# Patient Record
Sex: MALE | Race: WHITE | URBAN - METROPOLITAN AREA
[De-identification: names, ages, dates, MRNs, and addresses within clinical notes are randomized per-mention and may not be internally consistent; named-entity substitution may affect disease eponyms.]

---

## 2017-01-16 ENCOUNTER — HISTORICAL (OUTPATIENT)
Dept: ADMINISTRATIVE | Facility: HOSPITAL | Age: 72
End: 2017-01-16

## 2017-07-19 ENCOUNTER — HISTORICAL (OUTPATIENT)
Dept: LAB | Facility: HOSPITAL | Age: 72
End: 2017-07-19

## 2017-07-19 LAB — DEPRECATED CALCIDIOL+CALCIFEROL SERPL-MC: 99.3 NG/ML (ref 30–80)

## 2018-09-19 ENCOUNTER — HISTORICAL (OUTPATIENT)
Dept: ADMINISTRATIVE | Facility: HOSPITAL | Age: 73
End: 2018-09-19

## 2019-01-15 ENCOUNTER — HISTORICAL (OUTPATIENT)
Dept: LAB | Facility: HOSPITAL | Age: 74
End: 2019-01-15

## 2019-01-15 LAB
ALBUMIN SERPL-MCNC: 3.9 GM/DL (ref 3.4–5)
ALBUMIN/GLOB SERPL: 1 {RATIO}
ALP SERPL-CCNC: 56 UNIT/L (ref 50–136)
ALT SERPL-CCNC: 15 UNIT/L (ref 12–78)
AST SERPL-CCNC: 10 UNIT/L (ref 15–37)
BILIRUB SERPL-MCNC: 0.5 MG/DL (ref 0.2–1)
BILIRUBIN DIRECT+TOT PNL SERPL-MCNC: 0.1 MG/DL (ref 0–0.2)
BILIRUBIN DIRECT+TOT PNL SERPL-MCNC: 0.4 MG/DL (ref 0–0.8)
BUN SERPL-MCNC: 24 MG/DL (ref 7–18)
CALCIUM SERPL-MCNC: 9.5 MG/DL (ref 8.5–10.1)
CHLORIDE SERPL-SCNC: 105 MMOL/L (ref 98–107)
CHOLEST SERPL-MCNC: 126 MG/DL (ref 0–200)
CHOLEST/HDLC SERPL: 2.3 {RATIO} (ref 0–5)
CO2 SERPL-SCNC: 27 MMOL/L (ref 21–32)
CREAT SERPL-MCNC: 0.98 MG/DL (ref 0.7–1.3)
DEPRECATED CALCIDIOL+CALCIFEROL SERPL-MC: 51.54 NG/ML (ref 30–80)
GLOBULIN SER-MCNC: 4 GM/DL (ref 2.4–3.5)
GLUCOSE SERPL-MCNC: 70 MG/DL (ref 74–106)
HDLC SERPL-MCNC: 55 MG/DL (ref 35–60)
LDLC SERPL CALC-MCNC: 51 MG/DL (ref 0–129)
POTASSIUM SERPL-SCNC: 4.3 MMOL/L (ref 3.5–5.1)
PROT SERPL-MCNC: 7.9 GM/DL (ref 6.4–8.2)
PSA SERPL-MCNC: 1.53 NG/ML (ref 0–4)
SODIUM SERPL-SCNC: 140 MMOL/L (ref 136–145)
TRIGL SERPL-MCNC: 102 MG/DL (ref 30–150)
VLDLC SERPL CALC-MCNC: 20 MG/DL

## 2020-02-11 ENCOUNTER — HISTORICAL (OUTPATIENT)
Dept: ADMINISTRATIVE | Facility: HOSPITAL | Age: 75
End: 2020-02-11

## 2020-02-11 LAB
ABS NEUT (OLG): 4 X10(3)/MCL (ref 2.1–9.2)
ALBUMIN SERPL-MCNC: 3.3 GM/DL (ref 3.4–5)
ALBUMIN/GLOB SERPL: 1 {RATIO}
ALP SERPL-CCNC: 52 UNIT/L (ref 50–136)
ALT SERPL-CCNC: 14 UNIT/L (ref 12–78)
AST SERPL-CCNC: 7 UNIT/L (ref 15–37)
BASOPHILS # BLD AUTO: 0.1 X10(3)/MCL (ref 0–0.2)
BASOPHILS NFR BLD AUTO: 2 %
BILIRUB SERPL-MCNC: 0.5 MG/DL (ref 0.2–1)
BILIRUBIN DIRECT+TOT PNL SERPL-MCNC: 0.2 MG/DL (ref 0–0.2)
BILIRUBIN DIRECT+TOT PNL SERPL-MCNC: 0.3 MG/DL (ref 0–0.8)
BUN SERPL-MCNC: 16 MG/DL (ref 7–18)
CALCIUM SERPL-MCNC: 8.9 MG/DL (ref 8.5–10.1)
CHLORIDE SERPL-SCNC: 106 MMOL/L (ref 98–107)
CHOLEST SERPL-MCNC: 125 MG/DL (ref 0–200)
CHOLEST/HDLC SERPL: 2.2 {RATIO} (ref 0–5)
CO2 SERPL-SCNC: 32 MMOL/L (ref 21–32)
CREAT SERPL-MCNC: 0.96 MG/DL (ref 0.7–1.3)
DEPRECATED CALCIDIOL+CALCIFEROL SERPL-MC: 45.67 NG/ML (ref 30–80)
EOSINOPHIL # BLD AUTO: 0.5 X10(3)/MCL (ref 0–0.9)
EOSINOPHIL NFR BLD AUTO: 6 %
ERYTHROCYTE [DISTWIDTH] IN BLOOD BY AUTOMATED COUNT: 13.8 % (ref 11.5–17)
GLOBULIN SER-MCNC: 3.3 GM/DL (ref 2.4–3.5)
GLUCOSE SERPL-MCNC: 74 MG/DL (ref 74–106)
HCT VFR BLD AUTO: 45.7 % (ref 42–52)
HDLC SERPL-MCNC: 56 MG/DL (ref 35–60)
HGB BLD-MCNC: 14.9 GM/DL (ref 14–18)
LDLC SERPL CALC-MCNC: 45 MG/DL (ref 0–129)
LYMPHOCYTES # BLD AUTO: 2.9 X10(3)/MCL (ref 0.6–4.6)
LYMPHOCYTES NFR BLD AUTO: 36 %
MCH RBC QN AUTO: 31.1 PG (ref 27–31)
MCHC RBC AUTO-ENTMCNC: 32.6 GM/DL (ref 33–36)
MCV RBC AUTO: 95.4 FL (ref 80–94)
MONOCYTES # BLD AUTO: 0.6 X10(3)/MCL (ref 0.1–1.3)
MONOCYTES NFR BLD AUTO: 7 %
NEUTROPHILS # BLD AUTO: 4 X10(3)/MCL (ref 2.1–9.2)
NEUTROPHILS NFR BLD AUTO: 48 %
PLATELET # BLD AUTO: 216 X10(3)/MCL (ref 130–400)
PMV BLD AUTO: 10 FL (ref 9.4–12.4)
POTASSIUM SERPL-SCNC: 3.8 MMOL/L (ref 3.5–5.1)
PROT SERPL-MCNC: 6.6 GM/DL (ref 6.4–8.2)
RBC # BLD AUTO: 4.79 X10(6)/MCL (ref 4.7–6.1)
SODIUM SERPL-SCNC: 141 MMOL/L (ref 136–145)
TRIGL SERPL-MCNC: 120 MG/DL (ref 30–150)
VLDLC SERPL CALC-MCNC: 24 MG/DL
WBC # SPEC AUTO: 8.2 X10(3)/MCL (ref 4.5–11.5)

## 2020-11-10 ENCOUNTER — HISTORICAL (OUTPATIENT)
Dept: ADMINISTRATIVE | Facility: HOSPITAL | Age: 75
End: 2020-11-10

## 2020-11-10 LAB
ABS NEUT (OLG): 5.51 X10(3)/MCL (ref 2.1–9.2)
ALBUMIN SERPL-MCNC: 3 GM/DL (ref 3.4–4.8)
ALBUMIN/GLOB SERPL: 0.9 RATIO (ref 1.1–2)
ALP SERPL-CCNC: 70 UNIT/L (ref 40–150)
ALT SERPL-CCNC: 17 UNIT/L (ref 0–55)
AST SERPL-CCNC: 20 UNIT/L (ref 5–34)
BASOPHILS # BLD AUTO: 0.09 X10(3)/MCL (ref 0–0.2)
BASOPHILS NFR BLD AUTO: 1 % (ref 0–0.9)
BILIRUB SERPL-MCNC: 0.8 MG/DL (ref 0.2–1.2)
BILIRUBIN DIRECT+TOT PNL SERPL-MCNC: 0.3 MG/DL (ref 0–0.5)
BILIRUBIN DIRECT+TOT PNL SERPL-MCNC: 0.5 MG/DL (ref 0–0.8)
BUN SERPL-MCNC: 15.6 MG/DL (ref 8.4–25.7)
CALCIUM SERPL-MCNC: 8.8 MG/DL (ref 8.8–10)
CHLORIDE SERPL-SCNC: 105 MMOL/L (ref 98–107)
CO2 SERPL-SCNC: 23 MMOL/L (ref 23–31)
CREAT SERPL-MCNC: 0.75 MG/DL (ref 0.72–1.25)
DEPRECATED CALCIDIOL+CALCIFEROL SERPL-MC: 45.9 NG/ML (ref 30–80)
EOSINOPHIL # BLD AUTO: 0.38 X10(3)/MCL (ref 0–0.9)
EOSINOPHIL NFR BLD AUTO: 4.2 % (ref 0–6.5)
ERYTHROCYTE [DISTWIDTH] IN BLOOD BY AUTOMATED COUNT: 12.9 % (ref 11.5–17)
GLOBULIN SER-MCNC: 3.5 GM/DL (ref 2.4–3.5)
GLUCOSE SERPL-MCNC: 98 MG/DL (ref 82–115)
HCT VFR BLD AUTO: 46.9 % (ref 42–52)
HGB BLD-MCNC: 16 GM/DL (ref 14–18)
IMM GRANULOCYTES # BLD AUTO: 0.35 10*3/UL (ref 0–0.02)
IMM GRANULOCYTES NFR BLD AUTO: 3.9 % (ref 0–0.43)
LYMPHOCYTES # BLD AUTO: 1.86 X10(3)/MCL (ref 0.6–4.6)
LYMPHOCYTES NFR BLD AUTO: 20.5 % (ref 16.2–38.3)
MAGNESIUM SERPL-MCNC: 2.44 MG/DL (ref 1.6–2.6)
MCH RBC QN AUTO: 31.4 PG (ref 27–31)
MCHC RBC AUTO-ENTMCNC: 34.1 GM/DL (ref 33–36)
MCV RBC AUTO: 92 FL (ref 80–94)
MONOCYTES # BLD AUTO: 0.88 X10(3)/MCL (ref 0.1–1.3)
MONOCYTES NFR BLD AUTO: 9.7 % (ref 4.7–11.3)
NEUTROPHILS # BLD AUTO: 5.51 X10(3)/MCL (ref 2.1–9.2)
NEUTROPHILS NFR BLD AUTO: 60.7 % (ref 49.1–73.4)
NRBC BLD AUTO-RTO: 0 % (ref 0–0.2)
PHOSPHATE SERPL-MCNC: 4.2 MG/DL (ref 2.3–4.7)
PLATELET # BLD AUTO: 239 X10(3)/MCL (ref 130–400)
PMV BLD AUTO: 10.5 FL (ref 7.4–10.4)
POTASSIUM SERPL-SCNC: 4.4 MMOL/L (ref 3.5–5.1)
PREALB SERPL-MCNC: 15.8 MG/DL (ref 16–42)
PROT SERPL-MCNC: 6.5 GM/DL (ref 5.8–7.6)
RBC # BLD AUTO: 5.1 X10(6)/MCL (ref 4.7–6.1)
SODIUM SERPL-SCNC: 137 MMOL/L (ref 136–145)
WBC # SPEC AUTO: 9.1 X10(3)/MCL (ref 4.5–11.5)

## 2020-11-16 LAB
ABS NEUT (OLG): 3.63 X10(3)/MCL (ref 2.1–9.2)
ALBUMIN SERPL-MCNC: 3 GM/DL (ref 3.4–4.8)
ALBUMIN/GLOB SERPL: 0.7 RATIO (ref 1.1–2)
ALP SERPL-CCNC: 78 UNIT/L (ref 40–150)
ALT SERPL-CCNC: 23 UNIT/L (ref 0–55)
AST SERPL-CCNC: 21 UNIT/L (ref 5–34)
BASOPHILS # BLD AUTO: 0.07 X10(3)/MCL (ref 0–0.2)
BASOPHILS NFR BLD AUTO: 1 % (ref 0–0.9)
BILIRUB SERPL-MCNC: 0.6 MG/DL (ref 0.2–1.2)
BILIRUBIN DIRECT+TOT PNL SERPL-MCNC: 0.3 MG/DL (ref 0–0.5)
BILIRUBIN DIRECT+TOT PNL SERPL-MCNC: 0.3 MG/DL (ref 0–0.8)
BUN SERPL-MCNC: 17 MG/DL (ref 8.4–25.7)
CALCIUM SERPL-MCNC: 9.1 MG/DL (ref 8.8–10)
CHLORIDE SERPL-SCNC: 102 MMOL/L (ref 98–107)
CHOLEST SERPL-MCNC: 130 MG/DL
CHOLEST/HDLC SERPL: 5 {RATIO} (ref 0–5)
CO2 SERPL-SCNC: 20 MMOL/L (ref 23–31)
CREAT SERPL-MCNC: 0.93 MG/DL (ref 0.72–1.25)
EOSINOPHIL # BLD AUTO: 0.31 X10(3)/MCL (ref 0–0.9)
EOSINOPHIL NFR BLD AUTO: 4.4 % (ref 0–6.5)
ERYTHROCYTE [DISTWIDTH] IN BLOOD BY AUTOMATED COUNT: 13.1 % (ref 11.5–17)
GLOBULIN SER-MCNC: 4.1 GM/DL (ref 2.4–3.5)
GLUCOSE SERPL-MCNC: 87 MG/DL (ref 82–115)
HCT VFR BLD AUTO: 46 % (ref 42–52)
HDLC SERPL-MCNC: 26 MG/DL (ref 40–60)
HGB BLD-MCNC: 15.7 GM/DL (ref 14–18)
IMM GRANULOCYTES # BLD AUTO: 0.04 10*3/UL (ref 0–0.02)
IMM GRANULOCYTES NFR BLD AUTO: 0.6 % (ref 0–0.43)
LDLC SERPL CALC-MCNC: 84 MG/DL (ref 50–140)
LYMPHOCYTES # BLD AUTO: 2.37 X10(3)/MCL (ref 0.6–4.6)
LYMPHOCYTES NFR BLD AUTO: 33.4 % (ref 16.2–38.3)
MAGNESIUM SERPL-MCNC: 2.41 MG/DL (ref 1.6–2.6)
MCH RBC QN AUTO: 31 PG (ref 27–31)
MCHC RBC AUTO-ENTMCNC: 34.1 GM/DL (ref 33–36)
MCV RBC AUTO: 90.9 FL (ref 80–94)
MONOCYTES # BLD AUTO: 0.68 X10(3)/MCL (ref 0.1–1.3)
MONOCYTES NFR BLD AUTO: 9.6 % (ref 4.7–11.3)
NEUTROPHILS # BLD AUTO: 3.63 X10(3)/MCL (ref 2.1–9.2)
NEUTROPHILS NFR BLD AUTO: 51 % (ref 49.1–73.4)
NRBC BLD AUTO-RTO: 0 % (ref 0–0.2)
PHOSPHATE SERPL-MCNC: 3.9 MG/DL (ref 2.3–4.7)
PLATELET # BLD AUTO: 326 X10(3)/MCL (ref 130–400)
PMV BLD AUTO: 10.9 FL (ref 7.4–10.4)
POTASSIUM SERPL-SCNC: 3.9 MMOL/L (ref 3.5–5.1)
PREALB SERPL-MCNC: 17.2 MG/DL (ref 16–42)
PROT SERPL-MCNC: 7.1 GM/DL (ref 5.8–7.6)
RBC # BLD AUTO: 5.06 X10(6)/MCL (ref 4.7–6.1)
SODIUM SERPL-SCNC: 134 MMOL/L (ref 136–145)
TRIGL SERPL-MCNC: 99 MG/DL (ref 0–150)
VLDLC SERPL CALC-MCNC: 20 MG/DL
WBC # SPEC AUTO: 7.1 X10(3)/MCL (ref 4.5–11.5)

## 2020-11-18 LAB
ALBUMIN SERPL-MCNC: 2.9 GM/DL (ref 3.4–4.8)
ALBUMIN/GLOB SERPL: 0.8 RATIO (ref 1.1–2)
ALP SERPL-CCNC: 79 UNIT/L (ref 40–150)
ALT SERPL-CCNC: 25 UNIT/L (ref 0–55)
AST SERPL-CCNC: 22 UNIT/L (ref 5–34)
BILIRUB SERPL-MCNC: 0.7 MG/DL (ref 0.2–1.2)
BILIRUBIN DIRECT+TOT PNL SERPL-MCNC: 0.3 MG/DL (ref 0–0.8)
BILIRUBIN DIRECT+TOT PNL SERPL-MCNC: 0.4 MG/DL (ref 0–0.5)
BUN SERPL-MCNC: 13.8 MG/DL (ref 8.4–25.7)
CALCIUM SERPL-MCNC: 8.8 MG/DL (ref 8.8–10)
CHLORIDE SERPL-SCNC: 104 MMOL/L (ref 98–107)
CO2 SERPL-SCNC: 23 MMOL/L (ref 23–31)
CREAT SERPL-MCNC: 0.84 MG/DL (ref 0.72–1.25)
GLOBULIN SER-MCNC: 3.7 GM/DL (ref 2.4–3.5)
GLUCOSE SERPL-MCNC: 92 MG/DL (ref 82–115)
POTASSIUM SERPL-SCNC: 3.7 MMOL/L (ref 3.5–5.1)
PROT SERPL-MCNC: 6.6 GM/DL (ref 5.8–7.6)
SODIUM SERPL-SCNC: 136 MMOL/L (ref 136–145)

## 2020-11-23 LAB
ABS NEUT (OLG): 2.52 X10(3)/MCL (ref 2.1–9.2)
ALBUMIN SERPL-MCNC: 2.8 GM/DL (ref 3.4–4.8)
ALBUMIN/GLOB SERPL: 0.8 RATIO (ref 1.1–2)
ALP SERPL-CCNC: 83 UNIT/L (ref 40–150)
ALT SERPL-CCNC: 30 UNIT/L (ref 0–55)
AST SERPL-CCNC: 26 UNIT/L (ref 5–34)
BASOPHILS # BLD AUTO: 0.09 X10(3)/MCL (ref 0–0.2)
BASOPHILS NFR BLD AUTO: 1.5 % (ref 0–0.9)
BILIRUB SERPL-MCNC: 0.4 MG/DL (ref 0.2–1.2)
BILIRUBIN DIRECT+TOT PNL SERPL-MCNC: 0.2 MG/DL (ref 0–0.5)
BILIRUBIN DIRECT+TOT PNL SERPL-MCNC: 0.2 MG/DL (ref 0–0.8)
BUN SERPL-MCNC: 14.5 MG/DL (ref 8.4–25.7)
CALCIUM SERPL-MCNC: 9 MG/DL (ref 8.8–10)
CHLORIDE SERPL-SCNC: 103 MMOL/L (ref 98–107)
CO2 SERPL-SCNC: 22 MMOL/L (ref 23–31)
CREAT SERPL-MCNC: 0.92 MG/DL (ref 0.72–1.25)
EOSINOPHIL # BLD AUTO: 0.32 X10(3)/MCL (ref 0–0.9)
EOSINOPHIL NFR BLD AUTO: 5.3 % (ref 0–6.5)
ERYTHROCYTE [DISTWIDTH] IN BLOOD BY AUTOMATED COUNT: 13.2 % (ref 11.5–17)
GLOBULIN SER-MCNC: 3.4 GM/DL (ref 2.4–3.5)
GLUCOSE SERPL-MCNC: 90 MG/DL (ref 82–115)
HCT VFR BLD AUTO: 43.9 % (ref 42–52)
HGB BLD-MCNC: 14.8 GM/DL (ref 14–18)
IMM GRANULOCYTES # BLD AUTO: 0.03 10*3/UL (ref 0–0.02)
IMM GRANULOCYTES NFR BLD AUTO: 0.5 % (ref 0–0.43)
LYMPHOCYTES NFR BLD AUTO: 41.7 % (ref 16.2–38.3)
MAGNESIUM SERPL-MCNC: 1.98 MG/DL (ref 1.6–2.6)
MCH RBC QN AUTO: 30.8 PG (ref 27–31)
MCHC RBC AUTO-ENTMCNC: 33.7 GM/DL (ref 33–36)
MCV RBC AUTO: 91.5 FL (ref 80–94)
MONOCYTES # BLD AUTO: 0.55 X10(3)/MCL (ref 0.1–1.3)
MONOCYTES NFR BLD AUTO: 9.1 % (ref 4.7–11.3)
NEUTROPHILS # BLD AUTO: 2.52 X10(3)/MCL (ref 2.1–9.2)
NEUTROPHILS NFR BLD AUTO: 41.9 % (ref 49.1–73.4)
NRBC BLD AUTO-RTO: 0 % (ref 0–0.2)
PHOSPHATE SERPL-MCNC: 4.2 MG/DL (ref 2.3–4.7)
PLATELET # BLD AUTO: 314 X10(3)/MCL (ref 130–400)
PMV BLD AUTO: 10.7 FL (ref 7.4–10.4)
POTASSIUM SERPL-SCNC: 3.3 MMOL/L (ref 3.5–5.1)
PREALB SERPL-MCNC: 14.7 MG/DL (ref 16–42)
PROT SERPL-MCNC: 6.2 GM/DL (ref 5.8–7.6)
RBC # BLD AUTO: 4.8 X10(6)/MCL (ref 4.7–6.1)
SODIUM SERPL-SCNC: 138 MMOL/L (ref 136–145)
WBC # SPEC AUTO: 6 X10(3)/MCL (ref 4.5–11.5)

## 2020-11-25 LAB
BUN SERPL-MCNC: 14.1 MG/DL (ref 8.4–25.7)
CALCIUM SERPL-MCNC: 8.6 MG/DL (ref 8.8–10)
CHLORIDE SERPL-SCNC: 108 MMOL/L (ref 98–107)
CO2 SERPL-SCNC: 22 MMOL/L (ref 23–31)
CREAT SERPL-MCNC: 0.83 MG/DL (ref 0.72–1.25)
CREAT/UREA NIT SERPL: 17
GLUCOSE SERPL-MCNC: 139 MG/DL (ref 82–115)
POTASSIUM SERPL-SCNC: 3.9 MMOL/L (ref 3.5–5.1)
SODIUM SERPL-SCNC: 138 MMOL/L (ref 136–145)

## 2020-11-30 LAB
ABS NEUT (OLG): 4.14 X10(3)/MCL (ref 2.1–9.2)
ALBUMIN SERPL-MCNC: 2.8 GM/DL (ref 3.4–4.8)
ALBUMIN/GLOB SERPL: 0.8 RATIO (ref 1.1–2)
ALP SERPL-CCNC: 76 UNIT/L (ref 40–150)
ALT SERPL-CCNC: 16 UNIT/L (ref 0–55)
AST SERPL-CCNC: 13 UNIT/L (ref 5–34)
BASOPHILS # BLD AUTO: 0.06 X10(3)/MCL (ref 0–0.2)
BASOPHILS NFR BLD AUTO: 0.8 % (ref 0–0.9)
BILIRUB SERPL-MCNC: 0.4 MG/DL (ref 0.2–1.2)
BILIRUBIN DIRECT+TOT PNL SERPL-MCNC: 0.2 MG/DL (ref 0–0.5)
BILIRUBIN DIRECT+TOT PNL SERPL-MCNC: 0.2 MG/DL (ref 0–0.8)
BUN SERPL-MCNC: 11.1 MG/DL (ref 8.4–25.7)
CALCIUM SERPL-MCNC: 9 MG/DL (ref 8.8–10)
CHLORIDE SERPL-SCNC: 108 MMOL/L (ref 98–107)
CO2 SERPL-SCNC: 21 MMOL/L (ref 23–31)
CREAT SERPL-MCNC: 0.8 MG/DL (ref 0.72–1.25)
EOSINOPHIL # BLD AUTO: 0.45 X10(3)/MCL (ref 0–0.9)
EOSINOPHIL NFR BLD AUTO: 5.9 % (ref 0–6.5)
ERYTHROCYTE [DISTWIDTH] IN BLOOD BY AUTOMATED COUNT: 13.4 % (ref 11.5–17)
GLOBULIN SER-MCNC: 3.5 GM/DL (ref 2.4–3.5)
GLUCOSE SERPL-MCNC: 97 MG/DL (ref 82–115)
HCT VFR BLD AUTO: 42.9 % (ref 42–52)
HGB BLD-MCNC: 14.6 GM/DL (ref 14–18)
IMM GRANULOCYTES # BLD AUTO: 0.03 10*3/UL (ref 0–0.02)
IMM GRANULOCYTES NFR BLD AUTO: 0.4 % (ref 0–0.43)
LYMPHOCYTES # BLD AUTO: 2.38 X10(3)/MCL (ref 0.6–4.6)
LYMPHOCYTES NFR BLD AUTO: 31.2 % (ref 16.2–38.3)
MAGNESIUM SERPL-MCNC: 2.21 MG/DL (ref 1.6–2.6)
MCH RBC QN AUTO: 30.8 PG (ref 27–31)
MCHC RBC AUTO-ENTMCNC: 34 GM/DL (ref 33–36)
MCV RBC AUTO: 90.5 FL (ref 80–94)
MONOCYTES # BLD AUTO: 0.57 X10(3)/MCL (ref 0.1–1.3)
MONOCYTES NFR BLD AUTO: 7.5 % (ref 4.7–11.3)
NEUTROPHILS # BLD AUTO: 4.14 X10(3)/MCL (ref 2.1–9.2)
NEUTROPHILS NFR BLD AUTO: 54.2 % (ref 49.1–73.4)
NRBC BLD AUTO-RTO: 0 % (ref 0–0.2)
PHOSPHATE SERPL-MCNC: 4.1 MG/DL (ref 2.3–4.7)
PLATELET # BLD AUTO: 192 X10(3)/MCL (ref 130–400)
PMV BLD AUTO: 10 FL (ref 7.4–10.4)
POTASSIUM SERPL-SCNC: 4.2 MMOL/L (ref 3.5–5.1)
PREALB SERPL-MCNC: 20 MG/DL (ref 16–42)
PROT SERPL-MCNC: 6.3 GM/DL (ref 5.8–7.6)
RBC # BLD AUTO: 4.74 X10(6)/MCL (ref 4.7–6.1)
SODIUM SERPL-SCNC: 136 MMOL/L (ref 136–145)
WBC # SPEC AUTO: 7.6 X10(3)/MCL (ref 4.5–11.5)

## 2021-02-06 ENCOUNTER — HOSPITAL ENCOUNTER (OUTPATIENT)
Dept: MEDSURG UNIT | Facility: HOSPITAL | Age: 76
End: 2021-02-07
Attending: INTERNAL MEDICINE | Admitting: INTERNAL MEDICINE

## 2021-02-06 LAB
ALBUMIN SERPL-MCNC: 3.5 GM/DL (ref 3.4–4.8)
ALBUMIN/GLOB SERPL: 1 RATIO (ref 1.1–2)
ALP SERPL-CCNC: 62 UNIT/L (ref 40–150)
ALT SERPL-CCNC: 7 UNIT/L (ref 0–55)
ANION GAP SERPL CALC-SCNC: 14 MMOL/L
APPEARANCE, UA: CLEAR
AST SERPL-CCNC: 17 UNIT/L (ref 5–34)
BACTERIA SPEC CULT: NORMAL /HPF
BILIRUB SERPL-MCNC: 0.8 MG/DL
BILIRUB UR QL STRIP: NEGATIVE
BILIRUBIN DIRECT+TOT PNL SERPL-MCNC: 0.3 MG/DL (ref 0–0.5)
BILIRUBIN DIRECT+TOT PNL SERPL-MCNC: 0.5 MG/DL (ref 0–0.8)
BUN SERPL-MCNC: 12 MG/DL (ref 8.4–25.7)
BUN SERPL-MCNC: 15 MG/DL (ref 8–26)
CALCIUM SERPL-MCNC: 9 MG/DL (ref 8.8–10)
CHLORIDE SERPL-SCNC: 105 MMOL/L (ref 98–107)
CHLORIDE SERPL-SCNC: 105 MMOL/L (ref 98–109)
CO2 SERPL-SCNC: 20 MMOL/L (ref 23–31)
COLOR UR: YELLOW
CREAT SERPL-MCNC: 0.8 MG/DL (ref 0.6–1.3)
CREAT SERPL-MCNC: 0.84 MG/DL (ref 0.73–1.18)
ERYTHROCYTE [DISTWIDTH] IN BLOOD BY AUTOMATED COUNT: 14.6 % (ref 11.5–17)
GLOBULIN SER-MCNC: 3.5 GM/DL (ref 2.4–3.5)
GLUCOSE (UA): NEGATIVE
GLUCOSE SERPL-MCNC: 82 MG/DL (ref 82–115)
GLUCOSE SERPL-MCNC: 86 MG/DL (ref 70–105)
HCT VFR BLD AUTO: 45.9 % (ref 42–52)
HCT VFR BLD CALC: 45 % (ref 38–51)
HGB BLD-MCNC: 15.3 MG/DL (ref 12–17)
HGB BLD-MCNC: 15.4 GM/DL (ref 14–18)
HGB UR QL STRIP: NEGATIVE
KETONES UR QL STRIP: NEGATIVE
LEUKOCYTE ESTERASE UR QL STRIP: NEGATIVE
MCH RBC QN AUTO: 31.6 PG (ref 27–31)
MCHC RBC AUTO-ENTMCNC: 33.6 GM/DL (ref 33–36)
MCV RBC AUTO: 94.1 FL (ref 80–94)
NITRITE UR QL STRIP: NEGATIVE
PH UR STRIP: 6.5 [PH] (ref 5–9)
PLATELET # BLD AUTO: 274 X10(3)/MCL (ref 130–400)
PMV BLD AUTO: 9.6 FL (ref 9.4–12.4)
POC IONIZED CALCIUM: 1.05 MMOL/L (ref 1.12–1.32)
POC TCO2: 24 MMOL/L (ref 24–29)
POTASSIUM BLD-SCNC: 4.3 MMOL/L (ref 3.5–4.9)
POTASSIUM SERPL-SCNC: 4.7 MMOL/L (ref 3.5–5.1)
PROT SERPL-MCNC: 7 GM/DL (ref 5.8–7.6)
PROT UR QL STRIP: NEGATIVE
RBC # BLD AUTO: 4.88 X10(6)/MCL (ref 4.7–6.1)
RBC #/AREA URNS HPF: NORMAL /[HPF]
SARS-COV-2 RNA RESP QL NAA+PROBE: NOT DETECTED
SODIUM BLD-SCNC: 138 MMOL/L (ref 138–146)
SODIUM SERPL-SCNC: 136 MMOL/L (ref 136–145)
SP GR UR STRIP: 1.02 (ref 1–1.03)
SQUAMOUS EPITHELIAL, UA: NORMAL
TROP %DIFF IP 3 (OHS): NORMAL %
TROP 3HR (OHS): <0.01 NG/ML (ref 0–0.04)
TROP IP BASE (OHS): NORMAL NG/ML (ref 0–0.04)
TROPONIN I SERPL-MCNC: <0.01 NG/ML (ref 0–0.04)
TROPONIN I SERPL-MCNC: <0.01 NG/ML (ref 0–0.04)
TSH SERPL-ACNC: 1.59 UIU/ML (ref 0.35–4.94)
UROBILINOGEN UR STRIP-ACNC: 2
WBC # SPEC AUTO: 7.8 X10(3)/MCL (ref 4.5–11.5)
WBC #/AREA URNS HPF: NORMAL /HPF

## 2021-02-07 LAB
ABS NEUT (OLG): 3.08 X10(3)/MCL (ref 2.1–9.2)
BASOPHILS # BLD AUTO: 0.1 X10(3)/MCL (ref 0–0.2)
BASOPHILS NFR BLD AUTO: 1 %
BUN SERPL-MCNC: 11.6 MG/DL (ref 8.4–25.7)
CALCIUM SERPL-MCNC: 8.5 MG/DL (ref 8.8–10)
CHLORIDE SERPL-SCNC: 106 MMOL/L (ref 98–107)
CO2 SERPL-SCNC: 23 MMOL/L (ref 23–31)
CREAT SERPL-MCNC: 0.78 MG/DL (ref 0.73–1.18)
CREAT/UREA NIT SERPL: 15
EOSINOPHIL # BLD AUTO: 0.4 X10(3)/MCL (ref 0–0.9)
EOSINOPHIL NFR BLD AUTO: 7 %
ERYTHROCYTE [DISTWIDTH] IN BLOOD BY AUTOMATED COUNT: 14.5 % (ref 11.5–17)
GLUCOSE SERPL-MCNC: 93 MG/DL (ref 82–115)
HCT VFR BLD AUTO: 42.6 % (ref 42–52)
HGB BLD-MCNC: 14.3 GM/DL (ref 14–18)
LYMPHOCYTES # BLD AUTO: 2.3 X10(3)/MCL (ref 0.6–4.6)
LYMPHOCYTES NFR BLD AUTO: 36 %
MCH RBC QN AUTO: 31.1 PG (ref 27–31)
MCHC RBC AUTO-ENTMCNC: 33.6 GM/DL (ref 33–36)
MCV RBC AUTO: 92.6 FL (ref 80–94)
MONOCYTES # BLD AUTO: 0.5 X10(3)/MCL (ref 0.1–1.3)
MONOCYTES NFR BLD AUTO: 8 %
NEUTROPHILS # BLD AUTO: 3.08 X10(3)/MCL (ref 2.1–9.2)
NEUTROPHILS NFR BLD AUTO: 48 %
PLATELET # BLD AUTO: 237 X10(3)/MCL (ref 130–400)
PMV BLD AUTO: 10.4 FL (ref 9.4–12.4)
POTASSIUM SERPL-SCNC: 3.8 MMOL/L (ref 3.5–5.1)
RBC # BLD AUTO: 4.6 X10(6)/MCL (ref 4.7–6.1)
SODIUM SERPL-SCNC: 139 MMOL/L (ref 136–145)
TROP %DIFF IP 6 (OHS): NORMAL % (ref 0–29)
TROP 6HR (OHS): <0.01 NG/ML (ref 0–0.45)
TROP IP BASE (OHS): NORMAL NG/ML (ref 0–0.04)
WBC # SPEC AUTO: 6.4 X10(3)/MCL (ref 4.5–11.5)

## 2022-03-04 ENCOUNTER — HISTORICAL (OUTPATIENT)
Dept: ADMINISTRATIVE | Facility: HOSPITAL | Age: 77
End: 2022-03-04

## 2022-03-04 LAB
ABS NEUT (OLG): 2.75 (ref 2.1–9.2)
ALBUMIN SERPL-MCNC: 3.2 G/DL (ref 3.4–4.8)
ALBUMIN/GLOB SERPL: 1 {RATIO} (ref 1.1–2)
ALP SERPL-CCNC: 88 U/L (ref 40–150)
ALT SERPL-CCNC: 22 U/L (ref 0–55)
AST SERPL-CCNC: 17 U/L (ref 5–34)
BASOPHILS # BLD AUTO: 0.09 10*3/UL (ref 0–0.2)
BASOPHILS NFR BLD AUTO: 1.2 % (ref 0–0.9)
BILIRUB SERPL-MCNC: 0.3 MG/DL (ref 0.2–1.2)
BILIRUBIN DIRECT+TOT PNL SERPL-MCNC: 0.1 (ref 0–0.8)
BILIRUBIN DIRECT+TOT PNL SERPL-MCNC: 0.2 (ref 0–0.5)
BUN SERPL-MCNC: 18 MG/DL (ref 8.4–25.7)
CALCIUM SERPL-MCNC: 8.6 MG/DL (ref 8.8–10)
CHLORIDE SERPL-SCNC: 108 MMOL/L (ref 98–107)
CHOLEST SERPL-MCNC: 127 MG/DL
CHOLEST/HDLC SERPL: 4 {RATIO} (ref 0–5)
CO2 SERPL-SCNC: 25 MMOL/L (ref 23–31)
CREAT SERPL-MCNC: 0.77 MG/DL (ref 0.72–1.25)
EOSINOPHIL # BLD AUTO: 0.71 10*3/UL (ref 0–0.9)
EOSINOPHIL NFR BLD AUTO: 9.7 % (ref 0–6.5)
ERYTHROCYTE [DISTWIDTH] IN BLOOD BY AUTOMATED COUNT: 14.3 % (ref 11.5–17)
GLOBULIN SER-MCNC: 3.1 G/DL (ref 2.4–3.5)
GLUCOSE SERPL-MCNC: 101 MG/DL (ref 82–115)
HCT VFR BLD AUTO: 40.8 % (ref 42–52)
HDLC SERPL-MCNC: 31 MG/DL (ref 40–60)
HEMOLYSIS INTERF INDEX SERPL-ACNC: 4
HGB BLD-MCNC: 13.5 G/DL (ref 14–18)
ICTERIC INTERF INDEX SERPL-ACNC: 0
IMM GRANULOCYTES # BLD AUTO: 0.02 10*3/UL (ref 0–0.02)
IMM GRANULOCYTES NFR BLD AUTO: 0.3 % (ref 0–0.43)
LDLC SERPL CALC-MCNC: 67 MG/DL (ref 50–140)
LIPEMIC INTERF INDEX SERPL-ACNC: 9
LYMPHOCYTES # BLD AUTO: 3.14 10*3/UL (ref 0.6–4.6)
LYMPHOCYTES NFR BLD AUTO: 43.1 % (ref 16.2–38.3)
MANUAL DIFF? (OHS): NO
MCH RBC QN AUTO: 31 PG (ref 27–31)
MCHC RBC AUTO-ENTMCNC: 33.1 G/DL (ref 33–36)
MCV RBC AUTO: 93.8 FL (ref 80–94)
MONOCYTES # BLD AUTO: 0.58 10*3/UL (ref 0.1–1.3)
MONOCYTES NFR BLD AUTO: 8 % (ref 4.7–11.3)
NEUTROPHILS # BLD AUTO: 2.75 10*3/UL (ref 2.1–9.2)
NEUTROPHILS NFR BLD AUTO: 37.7 % (ref 49.1–73.4)
NRBC BLD AUTO-RTO: 0 % (ref 0–0.2)
PLATELET # BLD AUTO: 218 10*3/UL (ref 130–400)
PMV BLD AUTO: 10.8 FL (ref 7.4–10.4)
POTASSIUM SERPL-SCNC: 3.7 MMOL/L (ref 3.5–5.1)
PROT SERPL-MCNC: 6.3 G/DL (ref 5.8–7.6)
RBC # BLD AUTO: 4.35 10*6/UL (ref 4.7–6.1)
SODIUM SERPL-SCNC: 145 MMOL/L (ref 136–145)
TRIGL SERPL-MCNC: 145 MG/DL (ref 0–150)
VLDLC SERPL CALC-MCNC: 29 MG/DL
WBC # SPEC AUTO: 7.3 10*3/UL (ref 4.5–11.5)

## 2022-04-11 ENCOUNTER — HISTORICAL (OUTPATIENT)
Dept: ADMINISTRATIVE | Facility: HOSPITAL | Age: 77
End: 2022-04-11
Payer: MEDICARE

## 2022-04-27 VITALS
BODY MASS INDEX: 26.07 KG/M2 | WEIGHT: 172 LBS | HEIGHT: 68 IN | DIASTOLIC BLOOD PRESSURE: 78 MMHG | SYSTOLIC BLOOD PRESSURE: 134 MMHG | OXYGEN SATURATION: 96 %

## 2022-04-30 NOTE — ED PROVIDER NOTES
Patient:   Richard Carter            MRN: [id number]            FIN: [id number]               Age:   75 years     Sex:  Male     :  1945   Associated Diagnoses:   Generalized weakness; Shortness of breath at rest; Bradycardia, unspecified   Author:   Natacha GAMING, Matilda SIM      Basic Information   Time seen: Date & time 2021 17:08:00.   History source: Patient, son.   Arrival mode: Ambulance.   History limitation: None.   Additional information: Patient's physician(s): Juana GAMING, Mary BAILON, Jose A GAMING, Unruly SIM.      History of Present Illness   The patient presents with dizziness and     74 y/o CM with a history of CVA and arrhythmia presents to ED via EMS from assisted living facility with dizziness and low heart rate between 41 and 46 bpm. He reports weakness and shortness of breath, however denies chest pain or dysuria. His son states the dizziness is not unusual for him since having a stroke. Pt states he is currently feeling much better..  The onset was just prior to arrival.  The course/duration of symptoms is constant.  The character of symptoms is lightheaded.  The degree at present is minimal.  The exacerbating factor is none.  The relieving factor is none.  Risk factors consist of cerebral vascular accident.  Prior episodes: rare.  Therapy today: emergency medical services.  Associated symptoms: shortness of breath, denies chest pain and denies dysuria.        Review of Systems   Constitutional symptoms:  No fever, no chills, no sweats   Skin symptoms:  No rash, no petechiae.    Eye symptoms:  Vision unchangedNo pain, no discharge, no icterus, no diplopia, no blurred vision, no blindness.    ENMT symptoms:  No ear pain, no sore throat, no nasal congestion, no sinus pain.    Respiratory symptoms:  Shortness of breathNo orthopnea, no cough, no hemoptysis, no stridor, no wheezing.    Cardiovascular symptoms:  low heart rateNo chest pain, no palpitations, no syncope, no diaphoresis, no  peripheral edema.    Gastrointestinal symptoms:  No abdominal pain, no nausea, no vomiting, no diarrhea, no constipation, no rectal bleeding, no rectal pain.    Genitourinary symptoms:  No dysuria, no hematuria.    Musculoskeletal symptoms:  No back pain, no Muscle pain.    Neurologic symptoms:  No headache, no dizziness, no altered level of consciousness, no numbness, no tingling   Psychiatric symptoms:  Negative except as documented in HPI.   Endocrine symptoms:  Negative except as documented in HPI.   Hematologic/Lymphatic symptoms:  Negative except as documented in HPI      Health Status   Allergies:    Allergic Reactions (Selected)  No Known Medication Allergies.   Medications:  (Selected)   Prescriptions  Prescribed  DULoxetine 60 mg oral delayed release capsule: 60 mg = 1 cap(s), Oral, Daily, # 90 cap(s), 3 Refill(s), Pharmacy: [facility], 175, cm, Height/Length Dosing, 11/09/20 17:42:00 CST, 90, kg, Weight Dosing, 11/09/20 17:42:00 CST  Metoprolol Tartrate 25 mg oral tablet: See Instructions, TAKE ONE TABLET BY MOUTH TWICE DAILY, # 72 tab(s), 11 Refill(s), Pharmacy: [facility], 175, cm, Height/Length Dosing, 11/09/20 17:42:00 CST, 90, kg, Weight Dosing, 11/09/20 17:42:00 CST  cholecalciferol 1000 intl units (25 mcg) oral tablet, chewable: 1,000 IntUnit = 1 tab(s), Oral, Daily, # 90 tab(s), 3 Refill(s), Pharmacy: [facility], 175, cm, Height/Length Dosing, 11/09/20 17:42:00 CST, 90, kg, Weight Dosing, 11/09/20 17:42:00 CST  clopidogrel 75 mg oral tablet: 75 mg = 1 tab(s), Oral, Daily, # 30 tab(s), 0 Refill(s), Pharmacy: [facility], 175, cm, Height/Length Dosing, 11/09/20 17:42:00 CST, 90, kg, Weight Dosing, 11/09/20 17:42:00 CST  gabapentin 100 mg oral capsule: 200 mg = 2 cap(s), Oral, At Bedtime, # 180 cap(s), 3 Refill(s), Pharmacy: [facility], 175, cm, Height/Length Dosing, 11/09/20 17:42:00 CST, 90, kg, Weight Dosing, 11/09/20 17:42:00 CST  rosuvastatin 20 mg oral  tablet: See Instructions, TAKE ONE TABLET BY MOUTH AT BEDTIME, # 30 tab(s), 11 Refill(s), Pharmacy: [facility], 175, cm, Height/Length Dosing, 11/09/20 17:42:00 CST, 90, kg, Weight Dosing, 11/09/20 17:42:00 CST  verapamil 240 mg/24 hours oral capsule, extended release: 240 mg = 1 cap(s), Oral, Daily, # 90 cap(s), 3 Refill(s), Pharmacy: [facility], 175, cm, Height/Length Dosing, 11/09/20 17:42:00 CST, 90, kg, Weight Dosing, 11/09/20 17:42:00 CST  verapamil 80 mg oral tablet: 80 mg = 1 tab(s), Oral, TID, # 90 tab(s), 0 Refill(s), Pharmacy: [facility], 175, cm, Height/Length Dosing, 11/09/20 17:42:00 CST, 90, kg, Weight Dosing, 11/09/20 17:42:00 CST.      Past Medical/ Family/ Social History   Medical history:    Resolved  CVA - Cerebrovascular accident ([id number]):  Resolved.,   Arrhythmia(  Confirmed  )   Depression(  Confirmed  )   Dyslipidemia   GERD (gastroesophageal reflux disease)(  Confirmed  )   Impaired mobility   Obesity   Total self-care deficit   Vitamin D deficiency   .   Surgical history:    Cholecystectomy ([id number]) in the month of 8/2015 at 69 Years.  Craniotomy and decompression of brain ([id number])..   Family history: no reported family history.   Social history: Tobacco use: history of use, Occupation: Retired, Family/social situation: Assisted living resident.      Physical Examination               Vital Signs   Vital Signs   2/6/2021 17:00 CST       Temperature Temporal Artery               35.6 DegC  LOW                             Peripheral Pulse Rate     44 bpm  LOW                             Respiratory Rate          18 br/min                             SpO2                      100 %                             Oxygen Therapy            Room air                             Systolic Blood Pressure   129 mmHg                             Diastolic Blood Pressure  51 mmHg  LOW  .   Basic Oxygen Information   2/6/2021 17:00 CST       SpO2                       100 %                             Oxygen Therapy            Room air  .   General:  Alert, no acute distress, pleasant    Neurological:  Alert and oriented to person, place, time, and situation, No focal neurological deficit observed, chronic L-sided weakness   Head:  Normocephalic, atraumatic   Neck:  Supple, trachea midline, no tenderness   Skin:  Warm, dry, intact   Eye:  Pupils are equal, round and reactive to light, extraocular movements are intact   Cardiovascular:  Regular rate and rhythm, No murmur, Normal peripheral perfusion, Bradycardia   Respiratory:  Lungs are clear to auscultation, respirations are non-labored   Back:  Nontender, Normal range of motion   Musculoskeletal:  Normal ROM, normal strength   Gastrointestinal:  Soft, Nontender   Psychiatric:  Cooperative, appropriate mood & affect      Medical Decision Making   Differential Diagnosis:  Dizziness, generalized weakness, syncope, dysrhythmia, hypotension, hyperglycemia, hypoglycemia, dehydration.    Rationale:  pt with new bradycardia, initially symptomatic now asymptomatic, tsh, lytes wnl, no indication for active infection, bp stbale, cadiology consult, hold bb and ccb, and obs to pcp.   Documents reviewed:  Emergency department nurses' notes, prior records.    Orders  Launch Order Profile (Selected)   Inpatient Orders  Ordered  Cardiac Monitorin21 17:15:00 CST, Constant Order  Saline Lock Insert: 21 17:15:00 CST, Stop date 21 17:15:00 CST  Ordered (Dispatched)  CBC w/ Auto Diff: Now collect, 21 17:15:00 CST, Blood, Stop date 21 17:16:00 CST, Lab Collect, Print Label By Order Location, 21 17:15:00 CST  CMP: Stat collect, 21 17:15:00 CST, Blood, Stop date 21 17:16:00 CST, Lab Collect, Print Label By Order Location, 21 17:15:00 CST  POC ISTAT Chem8 Request:: Blood, Stat collect, 21 17:18:00 CST by Natacha GAMING, Matilda SIM, Stop date 21 17:18:00 CST, Lab Collect, Print Label By  Order Location  TSH: Stat collect, 21 17:18:00 CST, Blood, Stop date 21 17:18:00 CST, Lab Collect, Print Label By Order Location, 21 17:18:00 CST  Troponin-I: Stat collect, 21 17:15:00 CST, Blood, Stop date 21 17:16:00 CST, Lab Collect, Print Label By Order Location, 21 17:15:00 CST  UA with Reflex: Stat collect, Urine, 21 17:15:00 CST, Stop date 21 17:16:00 CST, Nurse collect  Ordered (Exam Started)  XR Chest 1 View: Stat, 21 17:15:00 CST, Dyspnea, None, Stretcher, Rad Type, Not Scheduled, 21 17:15:00 CST  Completed  EK21 17:15:00 CST, Stat, Once, 21 17:15:00 CST, -1, -1, 21 17:15:00 CST.   Electrocardiogram:  Time 2021 17:56:00, rate 44, marked sinus bradycardia, normal ME, no st changes.    Results review:  Lab results : Lab View   2021 19:14 CST       Est Creat Clearance Ser   83.02 mL/min    2021 18:23 CST       UA Appear                 CLEAR                             UA Color                  YELLOW                             UA Spec Grav              1.020                             UA Bili                   Negative                             UA pH                     6.5                             UA Urobilinogen           2.0                             UA Blood                  Negative                             UA Glucose                Negative                             UA Ketones                Negative                             UA Protein                Negative                             UA Nitrite                Negative                             UA Leuk Est               Negative                             UA WBC                    NONE SEEN /HPF                             UA RBC                    NONE SEEN                             UA Bacteria               NONE SEEN /HPF                             UA Squam Epithelial       NONE SEEN    2021 17:41 CST       POC Sodium                 138 mmol/L                             POC Potassium             4.3 mmol/L                             POC Chloride              105 mmol/L                             POC Ion Calcium           1.05 mmol/L  LOW                             POC Glucose               86 mg/dL                             POC BUN                   15.0 mg/dL                             POC Creatinine            0.8 mg/dL                             POC AGAP                  14.0  NA                             POC Hb                    15.3 mg/dL                             POC Hct                   45.0 %                             POC TCO2                  24.0 mmol/L    2/6/2021 17:18 CST       TSH                       1.5937 uIU/mL    2/6/2021 17:16 CST       Sodium Lvl                136 mmol/L                             Potassium Lvl             4.7 mmol/L                             Chloride                  105 mmol/L                             CO2                       20 mmol/L  LOW                             Calcium Lvl               9.0 mg/dL                             Glucose Lvl               82 mg/dL                             BUN                       12.0 mg/dL                             Creatinine                0.84 mg/dL                             eGFR-AA                   >60  NA                             eGFR-MYCHAL                  >60 mL/min/1.73 m2  NA                             Bili Total                0.8 mg/dL                             Bili Direct               0.3 mg/dL                             Bili Indirect             0.50 mg/dL                             AST                       17 unit/L                             ALT                       7 unit/L                             Alk Phos                  62 unit/L                             Total Protein             7.0 gm/dL                             Albumin Lvl               3.5 gm/dL                             Globulin                   3.5 gm/dL                             A/G Ratio                 1.0 ratio  LOW                             Troponin-I                <0.010 ng/mL                             WBC                       7.8 x10(3)/mcL                             RBC                       4.88 x10(6)/mcL                             Hgb                       15.4 gm/dL                             Hct                       45.9 %                             Platelet                  274 x10(3)/mcL                             MCV                       94.1 fL  HI                             MCH                       31.6 pg  HI                             MCHC                      33.6 gm/dL                             RDW                       14.6 %                             MPV                       9.6 fL    .   Notes:  cxr wnl  .      Reexamination/ Reevaluation   Time: 2/6/2021 18:16:00 .   Notes: hr now 50s, no cmplaints, stable and agreeable with plan.   Notes: hr 56-57, normotensive.      Procedure   Critical care note   Total time: 30 minutes spent engaged in work directly related to patient care and/ or available for direct patient care.   Critical condition(s) addressed for impending deterioration include: cardiovascular.   Associated risk factors: dysrhythmia, dehydration.   Management: bedside assessment, supervision of care, Interpretation (chest x-ray, electrocardiogram, blood pressure), Interventions hemodynamic management, Case review (medical specialist, nursing, family), Alternate history (family, emergency medical services).   Performed by: self.      Impression and Plan   Diagnosis   Generalized weakness (VEM49-GT R53.1)   Shortness of breath at rest (FPQ22-IU R06.02)   Bradycardia, unspecified (PBT43-QV R00.1)      Calls-Consults   -  2/6/2021 17:57:00 , Jose A GAMING, Unruly SIM, recommends Dr Valentino, on call for Dr. Naranjo, agrees if holding his BP can monitor his HR and hold.    -  2/6/2021 18:26:00 ,  Pameal GAMING, Deyanira Us.    Plan   Condition: Improved.    Disposition: Admit time  2/6/2021 18:26:00, Place in Observation Telemetry Unit.    Counseled: Patient, Regarding diagnosis, Regarding diagnostic results, Regarding treatment plan, Patient indicated understanding of instructions.    Notes:   I, Cherelle Sheikh, acted solely as a scribe for and in the presence of Dr. Manzano who performed the service..       Addendum   I, Matilda Manzano MD, performed the history, physical examination, MDM and procedures as above and agree with the scribe's documentation

## 2022-04-30 NOTE — DISCHARGE SUMMARY
Patient:   Richard Carter            MRN: [id number]            FIN: [id number]               Age:   75 years     Sex:  Male     :  1945   Associated Diagnoses:   None   Author:   Pamela GAMING, Deyanira Us      Please refer to the history and physical in the addendum from 2021.  The patient was cleared by cardiology and discharged back to assisted living facility in a stable condition  It was a admit/discharge on same day

## 2022-04-30 NOTE — CONSULTS
"   Patient:   Richard Carter            MRN: [id number]            FIN: [id number]               Age:   75 years     Sex:  Male     :  1945   Associated Diagnoses:   None   Author:   Sharon KING,BETH, Rosette WONG      Basic Information   Admit information:  Pt presented to the ER with c/o weakness and SOB.  He was noted to be bradycardic with rate in 40s and has been admitted for treatment. .    Source of history:  Family member, Medical record.    Present at bedside:  Family member.       Chief Complaint   2021 17:00 CST       pt to ER via [facility] for dizziness.  resident of [facility].  sent for low HR and lightheadedness.  pt denies complaints at this time, states he feels much better        History of Present Illness   This is a 74 yo WM pt of Dr Naranjo presenting to the ER with c/o weaknss, SOB and dizziness.  He was noted to be bradycardic and has been admitted for treatment.  He has a history of CVA approx 16 yrs ago and states that this is when he first saw Dr Naranjo.  He states that he has not seen him in years and that Dr Arias prescribes his medications.  He denies hx CAD, MI, or CHF.  States that he has had an "irregular heart beat" in the past but is unable to say if afib.  Pt's home meds included both Metoprolol and Verapamil.  Both Metoprolol and Verapamil have have been DC on admit and now pt's HR is in 60s.  He has had complete resolution of symptoms.  Cardiology has been consulted for evaluation.       Review of Systems   Constitutional:  Weakness, Decreased activity.    Eye:  Negative.    Ear/Nose/Mouth/Throat:  Negative.    Respiratory:  Negative.    Cardiovascular:  Bradycardia.    Gastrointestinal:  Negative.    Genitourinary:  Negative.    Hematology/Lymphatics:  Negative.    Endocrine:  Negative.    Immunologic:  Negative.    Musculoskeletal:  left sided weakness.    Integumentary:  Negative.    Neurologic:  Negative.    Psychiatric:  Negative.    ROS reviewed " as documented in chart      Health Status   Allergies:    Allergic Reactions (Selected)  No Known Medication Allergies   Current medications:  (Selected)   Inpatient Medications  Ordered  DULoxetine: 60 mg, form: Cap-DR, Oral, Daily, first dose 02/07/21 9:00:00 CST, Routine  NS 1,000 mL: 1,000 mL, 1,000 mL, IV, 100 mL/hr, start date 02/06/21 18:17:00 CST  Protonix: 40 mg, form: Tab-EC, Oral, Daily, first dose 02/06/21 18:58:00 CST, STAT  Zofran: 4 mg, form: Injection, IV Push, q4hr PRN for nausea, first dose 02/06/21 18:58:00 CST, STAT  acetaminophen: 650 mg, form: Liquid, Oral, q6hr PRN for fever, first dose 02/06/21 18:58:00 CST, STAT,  > 38.1 degrees Celsius (100.6 degrees Fahrenheit)  atorvastatin 40 mg oral tablet: 80 mg, form: Tab, Oral, Daily, first dose 02/07/21 17:00:00 CST, Routine  cholecalciferol 1000 intl units (25 mcg) oral tablet: 1,000 units, form: Tab, Oral, Daily, first dose 02/07/21 9:00:00 CST, Routine  clopidogrel 75 mg oral tablet: 75 mg, form: Tab, Oral, Daily, first dose 02/07/21 9:00:00 CST, Routine  gabapentin 100 mg oral capsule: 200 mg, form: Cap, Oral, At Bedtime, first dose 02/06/21 21:00:00 CST, Routine  traZODONE 50 mg oral tablet ( Desyrel ): 50 mg, form: Tab, Oral, qPM PRN for insomnia, first dose 02/06/21 18:59:00 CST, Routine  Prescriptions  Prescribed  DULoxetine 60 mg oral delayed release capsule: 60 mg = 1 cap(s), Oral, Daily, # 90 cap(s), 3 Refill(s), Pharmacy: [facility], 175, cm, Height/Length Dosing, 11/09/20 17:42:00 CST, 90, kg, Weight Dosing, 11/09/20 17:42:00 CST  cholecalciferol 1000 intl units (25 mcg) oral tablet, chewable: 1,000 IntUnit = 1 tab(s), Oral, Daily, # 90 tab(s), 3 Refill(s), Pharmacy: [facility], 175, cm, Height/Length Dosing, 11/09/20 17:42:00 CST, 90, kg, Weight Dosing, 11/09/20 17:42:00 CST  clopidogrel 75 mg oral tablet: 75 mg = 1 tab(s), Oral, Daily, # 30 tab(s), 0 Refill(s), Pharmacy: [facility], 175, cm, Height/Length  Dosing, 11/09/20 17:42:00 CST, 90, kg, Weight Dosing, 11/09/20 17:42:00 CST  gabapentin 100 mg oral capsule: 200 mg = 2 cap(s), Oral, At Bedtime, # 180 cap(s), 3 Refill(s), Pharmacy: [facility], 175, cm, Height/Length Dosing, 11/09/20 17:42:00 CST, 90, kg, Weight Dosing, 11/09/20 17:42:00 CST  rosuvastatin 20 mg oral tablet: See Instructions, TAKE ONE TABLET BY MOUTH AT BEDTIME, # 30 tab(s), 11 Refill(s), Pharmacy: [facility], 175, cm, Height/Length Dosing, 11/09/20 17:42:00 CST, 90, kg, Weight Dosing, 11/09/20 17:42:00 CST  verapamil 240 mg/24 hours oral capsule, extended release: 240 mg = 1 cap(s), Oral, Daily, # 90 cap(s), 3 Refill(s), Pharmacy: [facility], 175, cm, Height/Length Dosing, 11/09/20 17:42:00 CST, 90, kg, Weight Dosing, 11/09/20 17:42:00 CST  Documented Medications  Documented  metoprolol succinate 25 mg oral tablet, extended release: 25 mg = 1 tab(s), Oral, Daily  traZODONE 50 mg oral tablet ( Desyrel ): 50 mg = 1 tab(s), Oral, qPM      Histories   Past Medical History:    Resolved  CVA - Cerebrovascular accident ([id number]):  Resolved.   Family History:    No family history items have been selected or recorded.   Procedure history:    Cholecystectomy (18886422) in the month of 8/2015 at 69 Years.  Craniotomy and decompression of brain (0218900840).  Kyphoplasty of fracture of lumbar spine using computed tomography guidance (2145286071).   Social History        Social & Psychosocial Habits    Alcohol  06/23/2015  Use: Past    Employment/School  06/23/2015  Status: Retired    Description: [employer], [employer]    Highest education: High school    Exercise  06/23/2015  Duration (average number of minutes): 0    Home/Environment  06/23/2015  Lives with: Assisted living    Comment:  3 children - 06/23/2015 15:23 - [facility]  06/23/2015  Type of diet: Regular    Sexual  06/24/2015  Sexually active: No    Substance Use  06/23/2015   Use: Never    Tobacco  02/12/2020  Use: Former smoker, quit more    Patient Wants Consult For Cessation Counseling N/A    10/31/2020  Use: Former smoker, quit more    Patient Wants Consult For Cessation Counseling No    11/01/2020  Use: Former smoker, quit more    Patient Wants Consult For Cessation Counseling N/A    11/09/2020  Use: Former smoker, quit more    Patient Wants Consult For Cessation Counseling No    02/07/2021  Use: Former smoker, quit more    Patient Wants Consult For Cessation Counseling N/A    Abuse/Neglect  10/31/2020  SHX Any signs of abuse or neglect No    11/01/2020  SHX Any signs of abuse or neglect No    11/09/2020  SHX Any signs of abuse or neglect No    02/07/2021  SHX Any signs of abuse or neglect No  .        Physical Examination   Intake and Output   Fluid Balance Primitives   2/7/2021 7:00 CST        Oral Intake               360 mL                             Urine Voided              375 mL                             Stool Count               0        General:  No acute distress.    Eye:  Pupils are equal, round and reactive to light, Normal conjunctiva.    HENT:  Normocephalic.    Neck:  Supple.    Respiratory:  Lungs are clear to auscultation, Respirations are non-labored.    Cardiovascular:  Regular rhythm, Bradycardia.    Gastrointestinal:  Soft, Non-tender, Non-distended.    Vital Signs   2/7/2021 15:29 CST       Temperature Oral          36.5 DegC                             Temperature Oral (calculated)             97.70 DegF                             Peripheral Pulse Rate     64 bpm                             Respiratory Rate          17 br/min                             SpO2                      98 %                             Systolic Blood Pressure   149 mmHg  HI                             Diastolic Blood Pressure  72 mmHg                             Mean Arterial Pressure, Cuff              98 mmHg        Vital Signs (last 24 hrs)_____  Last Charted___________  Temp  Oral     36.5 DegC  (FEB 07 15:29)  Heart Rate Peripheral   64 bpm  (FEB 07 15:29)  Resp Rate         17 br/min  (FEB 07 15:29)  SBP      H 149mmHg  (FEB 07 15:29)  DBP      72 mmHg  (FEB 07 15:29)  SpO2      98 %  (FEB 07 15:29)  Weight      69.5 kg  (FEB 07 03:38)  Height      176 cm  (FEB 07 03:38)  BMI      22.44  (FEB 07 03:38)     Measurements from flowsheet : Measurements   2/7/2021 3:38 CST        Weight Dosing             69.5 kg                             Weight Measured           69.5 kg                             Weight Measured and Calculated in Lbs     153.22 lb                             Height/Length Dosing      176 cm                             Height/Length Measured    176 cm                             Body Mass Index Measured  22.44 kg/m2    2/6/2021 19:05 CST       Weight Dosing             82 kg                             Weight Measured and Calculated in Lbs     180.78 lb                             Weight Estimated          82 kg                             Height/Length Dosing      176 cm                             Height/Length Estimated   176 cm                             BSA Estimated             2 m2                             Body Mass Index Estimated 26.47 kg/m2     Musculoskeletal:  left hemiparesis.    Integumentary:  Warm, Dry.    Neurologic:  Alert, Oriented.    Psychiatric:  Cooperative, Appropriate mood & affect.       Review / Management   Results review:     Labs (Last four charted values)  WBC                  6.4 (FEB 07) 7.8 (FEB 06)   Hgb                  14.3 (FEB 07) 15.4 (FEB 06)   Hct                  42.6 (FEB 07) 45.9 (FEB 06)   Plt                  237 (FEB 07) 274 (FEB 06)   Na                   139 (FEB 07) 136 (FEB 06)   K                    3.8 (FEB 07) 4.7 (FEB 06)   CO2                  23 (FEB 07) L 20 (FEB 06)   Cl                   106 (FEB 07) 105 (FEB 06)   Cr                   0.78 (FEB 07) 0.84 (FEB 06)   BUN                  11.6 (FEB  07) 12.0 (FEB 06)   Glucose Random       93 (FEB 07) 82 (FEB 06) .    Laboratory Results   Today's Lab Results : PowerNote Discrete Results   2/7/2021 2:25 CST        Est Creat Clearance Ser   85.15 mL/min    2/7/2021 1:16 CST        WBC                       6.4 x10(3)/mcL                             RBC                       4.60 x10(6)/mcL  LOW                             Hgb                       14.3 gm/dL                             Hct                       42.6 %                             Platelet                  237 x10(3)/mcL                             MCV                       92.6 fL                             MCH                       31.1 pg  HI                             MCHC                      33.6 gm/dL                             RDW                       14.5 %                             MPV                       10.4 fL                             Abs Neut                  3.08 x10(3)/mcL                             Neutro Auto               48 %  NA                             Lymph Auto                36 %  NA                             Mono Auto                 8 %  NA                             Eos Auto                  7 %  NA                             Abs Eos                   0.4 x10(3)/mcL                             Basophil Auto             1 %  NA                             Abs Neutro                3.08 x10(3)/mcL                             Abs Lymph                 2.3 x10(3)/mcL                             Abs Mono                  0.5 x10(3)/mcL                             Abs Baso                  0.1 x10(3)/mcL                             Sodium Lvl                139 mmol/L                             Potassium Lvl             3.8 mmol/L                             Chloride                  106 mmol/L                             CO2                       23 mmol/L                             Calcium Lvl               8.5 mg/dL  LOW                              Glucose Lvl               93 mg/dL                             BUN                       11.6 mg/dL                             Creatinine                0.78 mg/dL                             BUN/Creat Ratio           15  NA                             eGFR-AA                   >60  NA                             eGFR-MYCHAL                  >60 mL/min/1.73 m2  NA                             Trop 6hr                  <0.010 ng/mL                             Trop %diff IP 6           n/a %        Cardiac Monitor:  Reveals a Sinus bradycardia.    ECG interpretation:  sinus bradycardia.    Condition:  Stable.       Impression and Plan   Sinus bradycardia  Hx remote CVA with left sided hemiparesis    Plan:  Metoprolol has been discontinued since admit and HR has improved  OK to DC home today.  Will allow for low dose Metoprolol and stop Verapamil  Advised pt's son to call Dr Naranjo's office tomorrow for followup appt  1-2 weeks

## 2022-05-05 NOTE — HISTORICAL [facility] CERNER
This is a historical note converted from [facility]. Formatting and pictures may have been removed.  Please reference [facility]rick for original formatting and attached multimedia. Chief Complaint  2 MONTH S/P LT CLAVICLE. PT STATES HE HAS NO PAIN NO COMPLAINTS.  History of Present Illness  7/13/2018: Left clavicle fracture, nonoperative treatment  ?  He returns today.? His been compliant in his sling.? His pains under good control.? He is no longer having pain around the collarbone.  Physical Exam  Vitals & Measurements  BP:?118/78?  HT:?172?cm? HT:?172?cm? HT:?172?cm? WT:?96?kg? WT:?96?kg? WT:?96?kg? BMI:?32.45?  He is got a callus bump here. ?There is no skin compromise.  Assessment/Plan  Fracture of left clavicle with routine healing  Doing well status post above. ?Discontinue the sling. ?His fracture is healed. ?I will see him back as needed  Ordered:  Post-Op follow-up visit [id number], Fracture of left clavicle with routine healing, [facility], 09/19/18 10:07:00 CDT  XR Clavicle Left, Routine, 09/19/18 9:43:00 CDT, Fracture, None, Ambulatory, Rad Type, Fracture of left clavicle with routine healing, Not Scheduled, 09/19/18 9:43:00 CDT  ?  Orders:  Clinic Follow-up PRN, 09/19/18 10:07:00 CDT, Future Order, [facility]   Problem List/Past Medical History  Ongoing  Arrhythmia(  Confirmed  )  CVA, old, hemiparesis(  Confirmed  )  Depression(  Confirmed  )  Dyslipidemia  GERD (gastroesophageal reflux disease)(  Confirmed  )  Obesity  Vitamin D deficiency  Historical  No qualifying data  Procedure/Surgical History  Immobilization of Left Upper Arm using Other Device (07/13/2018)  Strapping; shoulder (eg, [place]) (07/13/2018)  Cholecystectomy (08/2015)  Craniotomy and decompression of brain  Craniotomy and decompression of brain   Medications  acetaminophen-oxycodone 300 mg-5 mg oral tablet, 1 tab(s), Oral, q6hr, PRN,? ?Not taking  clopidogrel 75 mg oral tablet, See Instructions, 11  refills  docusate sodium 100 mg oral capsule, See Instructions, 12 refills  DULoxetine 60 mg oral delayed release capsule, 120 mg= 2 cap(s), Oral, Daily, 3 refills  Fish Oil oral capsule, 1 cap(s), Oral, Daily, 11 refills  ketoconazole 1% topical shampoo, 1 joy, TOP, q3day  methocarbamol 500 mg oral tablet, 1000 mg= 2 tab(s), Oral, QID  Metoprolol Succinate ER 25 mg oral tablet, extended release, See Instructions, 11 refills  Nexium 40 mg oral delayed release cap ([facility] Substitution), See Instructions, 11 refills  PAIN RELIEF PM  MG CP  TAB, See Instructions, 12 refills  rosuvastatin 20 mg oral tablet, 20 mg= 1 tab(s), Oral, Daily, 3 refills  traMADol 50 mg oral tablet, 50 mg= 1 tab(s), Oral, q8hr, PRN, 2 refills  verapamil 240 mg/12 hours oral tablet, extended release, See Instructions, 11 refills  Vitamin D3 2000 intl units oral capsule, See Instructions, 11 refills  Zeasorb-AF 2% topical powder, 1 joy, TOP, BID, 1 refills  Allergies  No Known Medication Allergies  Social History  Alcohol  Past, 06/23/2015  Employment/School  Retired, Work/School description: [employer]. Highest education level: High school., 06/23/2015  Exercise  Exercise duration: 0., 06/23/2015  Home/Environment  Lives with Assisted living., 06/23/2015  Nutrition/Health  Regular, 06/23/2015  Sexual  Sexually active: No., 06/24/2015  Substance Abuse  Never, 06/23/2015  Tobacco  Former smoker, 06/23/2015  Health Maintenance  Health Maintenance  ???Pending?(in the next year)  ??? ??OverDue  ??? ? ? ?Coronary Artery Disease Maintenance-Antiplatelet Agent Prescribed due??and every?  ??? ? ? ?Coronary Artery Disease Maintenance-Lipid Lowering Therapy due??and every?  ??? ? ? ?Pneumococcal Vaccine due??07/19/18??Variable frequency  ??? ? ? ?Tetanus Vaccine due??07/19/18??and every 10??year(s)  ??? ? ? ?Zoster Vaccine due??07/19/18??and every 100??year(s)  ??? ??Due?  ??? ? ? ?Aspirin Therapy for CVD Prevention  due??07/19/18??and every 1??year(s)  ??? ? ? ?ADL Screening due??09/19/18??and every 1??year(s)  ??? ? ? ?Abdominal Aortic Aneurysm Screening due??09/19/18??and every 100??year(s)  ??? ? ? ?Advance Directive due??09/19/18??and every 1??year(s)  ??? ? ? ?Cognitive Screening due??09/19/18??and every 1??year(s)  ??? ? ? ?Colorectal Screening (Sen[facility] Wellness) due??09/19/18??and every?  ??? ? ? ?Functional Assessment due??09/19/18??and every 1??year(s)  ??? ? ? ?Geriatric Depression Screening due??09/19/18??and every 1??year(s)  ??? ? ? ?Pneumococcal Vaccine due??09/19/18??and every?  ??? ??Due In Future?  ??? ? ? ?Fall Risk Assessment not due until??07/26/19??and every 1??year(s)  ???Satisfied?(in the past 1 year)  ??? ??Satisfied?  ??? ? ? ?Blood Pressure Screening on??09/19/18.??Satisfied by Angela Robertson  ??? ? ? ?Body Mass Index Check on??09/19/18.??Satisfied by Angela Robertson  ??? ? ? ?Coronary Artery Disease Maintenance-Antiplatelet Agent Prescribed on??03/21/18.??Satisfied by Mary Arias MD  ??? ? ? ?Depression Screening on??09/19/18.??Satisfied by Angela Robertson  ??? ? ? ?Diabetes Screening on??01/26/19.??Satisfied by Mary Arias MD  ??? ? ? ?Fall Risk Assessment on??07/26/18.??Satisfied by Italia Sawant LPN  ??? ? ? ?Lipid Screening on??01/26/19.??Satisfied by Snow MD, Mary D  ??? ? ? ?Obesity Screening on??09/19/18.??Satisfied by Angela Robertson  ?  ?  Diagnostic Results  Clavicle radiographs 9/19/2018:?2 views of clavicle show healed fracture

## 2022-05-05 NOTE — HISTORICAL [facility]
This is a historical note converted from [facility]. Formatting and pictures may have been removed.  Please reference Kendrick for original formatting and attached multimedia. Chief Complaint  Dizziness with slow heart rate  History of Present Illness  On-call for Dr. Arias  ?  Mr. Carter?is a 75-year-old gentleman with a history of CVA?and arrhythmia that presented to the hospital from the?assisted living facility with complaints of?dizziness?and a heart rate in the 40s.? Patient did complain of shortness of breath. ?No complaints of?chest pain or syncope etc.? The dizziness however is a chronic issue for the patient?since the CVA.  On medication review, patient is on?metoprolol 25 mg p.o. daily.? We decided to hold the beta-blocker. ?Cardiology was consulted.  Labs essentially normal, chest x-ray negative  ?  The patient was seen and examined this morning?and overall seems to be doing well. ?No complaints of shortness of breath?and working on his breakfast at the time. ?Heart rate in the?upper 50s?to low 60s.? Awaiting cardiology. ?Can possibly be discharged?back to the assisted living facility  Review of Systems  Except as documented above, all other systems reviewed and negative  Physical Exam  Vitals & Measurements  T:?36.4? ?C (Oral)? TMIN:?35.6? ?C (Temporal Artery)? TMAX:?36.6? ?C (Oral)? HR:?59(Peripheral)? RR:?18? BP:?103/65? SpO2:?96%? WT:?69.5?kg? BMI:?22.44?  ??General: ?Alert, no acute distress,?pleasant elderly gentleman  ??Head: ?Normocephalic, atraumatic?  ??Neck: ?Supple, trachea midline, no tenderness?  ??Skin: ?Warm, dry, intact?  ??Eye: ?Pupils are equal, round and reactive to light, extraocular movements are intact?  ??Cardiovascular:?Decreased rate?and regular?rhythm  ??Respiratory: ?Lungs are clear to auscultation,?no wheeze, rhonchi or crackles  ??Back: ?Nontender, Normal range of motion?  ??Musculoskeletal: ?Normal ROM, normal strength, gait not assessed  ??Gastrointestinal: ?Soft, Nontender,  bowel sounds present  ???????? Neurological: ?Alert and oriented , No focal neurological deficit observed,?chronic L-sided weakness?  ??Psychiatric: ?Cooperative, appropriate mood & affect?  ?  Assessment/Plan  Bradycardia, unspecified?R00.1  ?-Possibly from?beta-blocker?metoprolol 25 mg p.o. daily  -Currently being held with improvement in?heart rate from the 40s to the 60s  -Awaiting cardiology, can possibly be discharged?if continues to do well  ?  Dizziness?[id number]  ?-Chronic issue for the patient, could be exacerbated with the bradycardia  ?  Generalized weakness?R53.1  ?-Therapy services, patient does have?a?residual left-sided weakness from his previous CVA  ?  Shortness of breath at rest?R06.02  ?-Resolved   Problem List/Past Medical History  Ongoing  Arrhythmia(  Confirmed  )  CVA, old, hemiparesis(  Confirmed  )  Depression(  Confirmed  )  Dyslipidemia  GERD (gastroesophageal reflux disease)(  Confirmed  )  Obesity  Vitamin D deficiency  Historical  CVA - Cerebrovascular accident  Procedure/Surgical History  Immobilization of Left Upper Arm using Other Device (07/13/2018)  Strapping; shoulder (eg, [place]) (07/13/2018)  Cholecystectomy (08.2015)  Craniotomy and decompression of brain  Kyphoplasty of fracture of lumbar spine using computed tomography guidance   Medications  Inpatient  acetaminophen, 650 mg= 20.3 mL, Oral, q6hr, PRN  atorvastatin 40 mg oral tablet, 80 mg= 2 tab(s), Oral, Daily  cholecalciferol 1000 intl units (25 mcg) oral tablet, 1000 units= 1 tab(s), Oral, Daily  clopidogrel 75 mg oral tablet, 75 mg= 1 tab(s), Oral, Daily  DULoxetine, 60 mg= 2 cap(s), Oral, Daily  gabapentin 100 mg oral capsule, 200 mg= 2 cap(s), Oral, At Bedtime  NS 1,000 mL, 1000 mL, IV  Protonix, 40 mg= 1 tab(s), Oral, Daily  traZODONE 50 mg oral tablet ( Desyrel ), 50 mg= 1 tab(s), Oral, qPM, PRN  Zofran, 4 mg= 2 mL, IV Push, q4hr, PRN  Home  cholecalciferol 1000 intl units (25 mcg)  oral tablet, chewable, 1000 IntUnit= 1 tab(s), Oral, Daily, 3 refills  clopidogrel 75 mg oral tablet, 75 mg= 1 tab(s), Oral, Daily  DULoxetine 60 mg oral delayed release capsule, 60 mg= 1 cap(s), Oral, Daily, 3 refills  gabapentin 100 mg oral capsule, 200 mg= 2 cap(s), Oral, At Bedtime, 3 refills  metoprolol succinate 25 mg oral tablet, extended release, 25 mg= 1 tab(s), Oral, Daily  rosuvastatin 20 mg oral tablet, See Instructions, 11 refills  traZODONE 50 mg oral tablet ( Desyrel ), 50 mg= 1 tab(s), Oral, qPM  verapamil 240 mg/24 hours oral capsule, extended release, 240 mg= 1 cap(s), Oral, Daily, 3 refills  Allergies  No Known Medication Allergies  Social History  Abuse/Neglect  No, 02/07/2021  No, 11/09/2020  No, 11/01/2020  No, 10/31/2020  Alcohol  Employment/School  Home/Environment  Lives with Assisted living., 06/23/2015  Nutrition/Health  Regular, 06/23/2015  Substance Use  Never, 06/23/2015  Immunizations  Vaccine Date Status Comments   influenza virus vaccine, inactivated 10/15/2018 Recorded    influenza virus vaccine, inactivated 10/11/2017 Recorded    influenza virus vaccine, inactivated 10/01/2015 Recorded    influenza virus vaccine, inactivated 11/25/2014 Recorded    influenza virus vaccine, inactivated 09/16/2013 Recorded    influenza virus vaccine, inactivated 10/10/2011 Recorded 2019-07-18: UNKNOWN CAMPAIGNID   Lab Results  Labs Last 24 Hours?  ?Chemistry? Hematology/Coagulation? Blood Gases?   Sodium Lvl: 139 mmol/L (02/07/21 01:16:00) WBC: 6.4 x10(3)/mcL (02/07/21 01:16:00) POC TCO2: 24 mmol/L (02/06/21 17:41:00)   POC Sodium: 138 mmol/L (02/06/21 17:41:00) RBC:?4.6 x10(6)/mcL?Low (02/07/21 01:16:00)    Potassium Lvl: 3.8 mmol/L (02/07/21 01:16:00) Hgb: 14.3 gm/dL (02/07/21 01:16:00)    POC Potassium: 4.3 mmol/L (02/06/21 17:41:00) POC Hb: 15.3 mg/dL (02/06/21 17:41:00)    Chloride: 106 mmol/L (02/07/21 01:16:00) Hct: 42.6 % (02/07/21 01:16:00)    POC Chloride: 105 mmol/L (02/06/21 17:41:00) POC  Hct: 45 % (02/06/21 17:41:00)    CO2: 23 mmol/L (02/07/21 01:16:00) Platelet: 237 x10(3)/mcL (02/07/21 01:16:00)    Calcium Lvl:?8.5 mg/dL?Low (02/07/21 01:16:00) MCV: 92.6 fL (02/07/21 01:16:00)    POC Ion Calcium:?1.05 mmol/L?Low (02/06/21 17:41:00) MCH:?31.1 pg?High (02/07/21 01:16:00)    Glucose Lvl: 93 mg/dL (02/07/21 01:16:00) MCHC: 33.6 gm/dL (02/07/21 01:16:00)    POC Glucose: 86 mg/dL (02/06/21 17:41:00) RDW: 14.5 % (02/07/21 01:16:00)    BUN: 11.6 mg/dL (02/07/21 01:16:00) MPV: 10.4 fL (02/07/21 01:16:00)    POC BUN: 15 mg/dL (02/06/21 17:41:00) Abs Neut: 3.08 x10(3)/mcL (02/07/21 01:16:00)    Creatinine: 0.78 mg/dL (02/07/21 01:16:00) Neutro Auto: 48 % (02/07/21 01:16:00)    POC Creatinine: 0.8 mg/dL (02/06/21 17:41:00) Lymph Auto: 36 % (02/07/21 01:16:00)    Est Creat Clearance Ser: 85.15 mL/min (02/07/21 02:25:10) Mono Auto: 8 % (02/07/21 01:16:00)    BUN/Creat Ratio: 15 (02/07/21 01:16:00) Eos Auto: 7 % (02/07/21 01:16:00)    eGFR-AA: >60 (02/07/21 01:16:00) Abs Eos: 0.4 x10(3)/mcL (02/07/21 01:16:00)    eGFR-MYCHAL: >60 (02/07/21 01:16:00) Basophil Auto: 1 % (02/07/21 01:16:00)    Bili Total: 0.8 mg/dL (02/06/21 17:16:00) Abs Neutro: 3.08 x10(3)/mcL (02/07/21 01:16:00)    Bili Direct: 0.3 mg/dL (02/06/21 17:16:00) Abs Lymph: 2.3 x10(3)/mcL (02/07/21 01:16:00)    Bili Indirect: 0.5 mg/dL (02/06/21 17:16:00) Abs Mono: 0.5 x10(3)/mcL (02/07/21 01:16:00)    AST: 17 unit/L (02/06/21 17:16:00) Abs Baso: 0.1 x10(3)/mcL (02/07/21 01:16:00)    ALT: 7 unit/L (02/06/21 17:16:00)     Alk Phos: 62 unit/L (02/06/21 17:16:00)     Total Protein: 7 gm/dL (02/06/21 17:16:00)     Albumin Lvl: 3.5 gm/dL (02/06/21 17:16:00)     Globulin: 3.5 gm/dL (02/06/21 17:16:00)     A/G Ratio:?1 ratio?Low (02/06/21 17:16:00)     POC AGAP: 14 (02/06/21 17:41:00)     Troponin-I: <0.010 (02/06/21 19:35:00)     Trop 3hr: <0.010 (02/06/21 21:32:00)     Trop %diff IP 3: n/a (02/06/21 21:32:00)     Trop 6hr: <0.010 (02/07/21 01:16:00)     Trop  %diff IP 6: n/a (02/07/21 01:16:00)     TSH: 1.5937 uIU/mL (02/06/21 17:18:00)         After patient was seen by cardiology he was cleared for discharge.? Instructions to hold the verapamil?and?the metoprolol will be continued at 25 mg p.o. daily.? He will have a close follow-up with them in clinic.  Overall patient is doing well?and is stable for discharge back to the assisted living  Condition: Stable  Activity: As tolerated  Medications: Per med rec sheet  Follow-up:?Dr. Naranjo with cardiology, Dr. Arias?with internal medicine  ?  Total time spent in admission and discharge:?70 minutes

## 2022-09-06 ENCOUNTER — LAB REQUISITION (OUTPATIENT)
Dept: LAB | Facility: HOSPITAL | Age: 77
End: 2022-09-06
Payer: MEDICARE

## 2022-09-06 DIAGNOSIS — I10 ESSENTIAL (PRIMARY) HYPERTENSION: ICD-10-CM

## 2022-09-06 DIAGNOSIS — E78.5 HYPERLIPIDEMIA, UNSPECIFIED: ICD-10-CM

## 2022-09-06 LAB
ALBUMIN SERPL-MCNC: 3.5 GM/DL (ref 3.4–4.8)
ALBUMIN/GLOB SERPL: 1.2 RATIO (ref 1.1–2)
ALP SERPL-CCNC: 61 UNIT/L (ref 40–150)
ALT SERPL-CCNC: 20 UNIT/L (ref 0–55)
AST SERPL-CCNC: 20 UNIT/L (ref 5–34)
BASOPHILS # BLD AUTO: 0.1 X10(3)/MCL (ref 0–0.2)
BASOPHILS NFR BLD AUTO: 1.3 %
BILIRUBIN DIRECT+TOT PNL SERPL-MCNC: 0.8 MG/DL
BUN SERPL-MCNC: 21.8 MG/DL (ref 8.4–25.7)
CALCIUM SERPL-MCNC: 8.9 MG/DL (ref 8.8–10)
CHLORIDE SERPL-SCNC: 109 MMOL/L (ref 98–107)
CHOLEST SERPL-MCNC: 122 MG/DL
CHOLEST/HDLC SERPL: 4 {RATIO} (ref 0–5)
CO2 SERPL-SCNC: 23 MMOL/L (ref 23–31)
CREAT SERPL-MCNC: 0.76 MG/DL (ref 0.73–1.18)
EOSINOPHIL # BLD AUTO: 0.6 X10(3)/MCL (ref 0–0.9)
EOSINOPHIL NFR BLD AUTO: 7.7 %
ERYTHROCYTE [DISTWIDTH] IN BLOOD BY AUTOMATED COUNT: 12.5 % (ref 11.5–17)
GFR SERPLBLD CREATININE-BSD FMLA CKD-EPI: >60 MLS/MIN/1.73/M2
GLOBULIN SER-MCNC: 2.9 GM/DL (ref 2.4–3.5)
GLUCOSE SERPL-MCNC: 66 MG/DL (ref 82–115)
HCT VFR BLD AUTO: 43.5 % (ref 42–52)
HDLC SERPL-MCNC: 33 MG/DL (ref 35–60)
HGB BLD-MCNC: 15 GM/DL (ref 14–18)
IMM GRANULOCYTES # BLD AUTO: 0.03 X10(3)/MCL (ref 0–0.04)
IMM GRANULOCYTES NFR BLD AUTO: 0.4 %
LDLC SERPL CALC-MCNC: 64 MG/DL (ref 50–140)
LYMPHOCYTES # BLD AUTO: 3.19 X10(3)/MCL (ref 0.6–4.6)
LYMPHOCYTES NFR BLD AUTO: 40.7 %
MCH RBC QN AUTO: 32.5 PG (ref 27–31)
MCHC RBC AUTO-ENTMCNC: 34.5 MG/DL (ref 33–36)
MCV RBC AUTO: 94.4 FL (ref 80–94)
MONOCYTES # BLD AUTO: 0.53 X10(3)/MCL (ref 0.1–1.3)
MONOCYTES NFR BLD AUTO: 6.8 %
NEUTROPHILS # BLD AUTO: 3.4 X10(3)/MCL (ref 2.1–9.2)
NEUTROPHILS NFR BLD AUTO: 43.1 %
NRBC BLD AUTO-RTO: 0 %
PLATELET # BLD AUTO: 216 X10(3)/MCL (ref 130–400)
PMV BLD AUTO: 10.9 FL (ref 7.4–10.4)
POTASSIUM SERPL-SCNC: 3.9 MMOL/L (ref 3.5–5.1)
PROT SERPL-MCNC: 6.4 GM/DL (ref 5.8–7.6)
RBC # BLD AUTO: 4.61 X10(6)/MCL (ref 4.7–6.1)
SODIUM SERPL-SCNC: 143 MMOL/L (ref 136–145)
TRIGL SERPL-MCNC: 124 MG/DL (ref 34–140)
VLDLC SERPL CALC-MCNC: 25 MG/DL
WBC # SPEC AUTO: 7.8 X10(3)/MCL (ref 4.5–11.5)

## 2022-09-06 PROCEDURE — 80061 LIPID PANEL: CPT | Performed by: INTERNAL MEDICINE

## 2022-09-06 PROCEDURE — 85025 COMPLETE CBC W/AUTO DIFF WBC: CPT | Performed by: INTERNAL MEDICINE

## 2022-09-06 PROCEDURE — 80053 COMPREHEN METABOLIC PANEL: CPT | Performed by: INTERNAL MEDICINE

## 2023-03-13 ENCOUNTER — LAB REQUISITION (OUTPATIENT)
Dept: LAB | Facility: HOSPITAL | Age: 78
End: 2023-03-13
Payer: MEDICARE

## 2023-03-13 DIAGNOSIS — E78.5 HYPERLIPIDEMIA, UNSPECIFIED: ICD-10-CM

## 2023-03-13 DIAGNOSIS — I10 ESSENTIAL (PRIMARY) HYPERTENSION: ICD-10-CM

## 2023-03-13 LAB
ALBUMIN SERPL-MCNC: 3.1 G/DL (ref 3.4–4.8)
ALBUMIN/GLOB SERPL: 1.2 RATIO (ref 1.1–2)
ALP SERPL-CCNC: 67 UNIT/L (ref 40–150)
ALT SERPL-CCNC: 13 UNIT/L (ref 0–55)
AST SERPL-CCNC: 10 UNIT/L (ref 5–34)
BASOPHILS # BLD AUTO: 0.12 X10(3)/MCL (ref 0–0.2)
BASOPHILS NFR BLD AUTO: 1.7 %
BILIRUBIN DIRECT+TOT PNL SERPL-MCNC: 0.3 MG/DL
BUN SERPL-MCNC: 16.8 MG/DL (ref 8.4–25.7)
CALCIUM SERPL-MCNC: 8.4 MG/DL (ref 8.8–10)
CHLORIDE SERPL-SCNC: 110 MMOL/L (ref 98–107)
CHOLEST SERPL-MCNC: 123 MG/DL
CHOLEST/HDLC SERPL: 4 {RATIO} (ref 0–5)
CO2 SERPL-SCNC: 27 MMOL/L (ref 23–31)
CREAT SERPL-MCNC: 0.8 MG/DL (ref 0.73–1.18)
EOSINOPHIL # BLD AUTO: 0.49 X10(3)/MCL (ref 0–0.9)
EOSINOPHIL NFR BLD AUTO: 6.8 %
ERYTHROCYTE [DISTWIDTH] IN BLOOD BY AUTOMATED COUNT: 13.2 % (ref 11.5–17)
GFR SERPLBLD CREATININE-BSD FMLA CKD-EPI: >60 MLS/MIN/1.73/M2
GLOBULIN SER-MCNC: 2.6 GM/DL (ref 2.4–3.5)
GLUCOSE SERPL-MCNC: 71 MG/DL (ref 82–115)
HCT VFR BLD AUTO: 40.9 % (ref 42–52)
HDLC SERPL-MCNC: 32 MG/DL (ref 35–60)
HGB BLD-MCNC: 13.8 G/DL (ref 14–18)
IMM GRANULOCYTES # BLD AUTO: 0.03 X10(3)/MCL (ref 0–0.04)
IMM GRANULOCYTES NFR BLD AUTO: 0.4 %
LDLC SERPL CALC-MCNC: 51 MG/DL (ref 50–140)
LYMPHOCYTES # BLD AUTO: 3.1 X10(3)/MCL (ref 0.6–4.6)
LYMPHOCYTES NFR BLD AUTO: 43.3 %
MCH RBC QN AUTO: 32.4 PG
MCHC RBC AUTO-ENTMCNC: 33.7 G/DL (ref 33–36)
MCV RBC AUTO: 96 FL (ref 80–94)
MONOCYTES # BLD AUTO: 0.59 X10(3)/MCL (ref 0.1–1.3)
MONOCYTES NFR BLD AUTO: 8.2 %
NEUTROPHILS # BLD AUTO: 2.83 X10(3)/MCL (ref 2.1–9.2)
NEUTROPHILS NFR BLD AUTO: 39.6 %
NRBC BLD AUTO-RTO: 0 %
PLATELET # BLD AUTO: 204 X10(3)/MCL (ref 130–400)
PMV BLD AUTO: 11.2 FL (ref 7.4–10.4)
POTASSIUM SERPL-SCNC: 3.8 MMOL/L (ref 3.5–5.1)
PROT SERPL-MCNC: 5.7 GM/DL (ref 5.8–7.6)
RBC # BLD AUTO: 4.26 X10(6)/MCL (ref 4.7–6.1)
SODIUM SERPL-SCNC: 144 MMOL/L (ref 136–145)
TRIGL SERPL-MCNC: 198 MG/DL (ref 34–140)
VLDLC SERPL CALC-MCNC: 40 MG/DL
WBC # SPEC AUTO: 7.2 X10(3)/MCL (ref 4.5–11.5)

## 2023-03-13 PROCEDURE — 80053 COMPREHEN METABOLIC PANEL: CPT | Performed by: INTERNAL MEDICINE

## 2023-03-13 PROCEDURE — 80061 LIPID PANEL: CPT | Performed by: INTERNAL MEDICINE

## 2023-03-13 PROCEDURE — 85025 COMPLETE CBC W/AUTO DIFF WBC: CPT | Performed by: INTERNAL MEDICINE

## 2023-09-06 ENCOUNTER — LAB REQUISITION (OUTPATIENT)
Dept: LAB | Facility: HOSPITAL | Age: 78
End: 2023-09-06
Payer: MEDICARE

## 2023-09-06 DIAGNOSIS — I10 ESSENTIAL (PRIMARY) HYPERTENSION: ICD-10-CM

## 2023-09-06 DIAGNOSIS — E78.5 HYPERLIPIDEMIA, UNSPECIFIED: ICD-10-CM

## 2023-09-06 LAB
ALBUMIN SERPL-MCNC: 2.5 G/DL (ref 3.4–4.8)
ALBUMIN/GLOB SERPL: 0.8 RATIO (ref 1.1–2)
ALP SERPL-CCNC: 82 UNIT/L (ref 40–150)
ALT SERPL-CCNC: 9 UNIT/L (ref 0–55)
AST SERPL-CCNC: 16 UNIT/L (ref 5–34)
BASOPHILS # BLD AUTO: 0.1 X10(3)/MCL
BASOPHILS NFR BLD AUTO: 0.9 %
BILIRUB SERPL-MCNC: 0.4 MG/DL
BUN SERPL-MCNC: 15.5 MG/DL (ref 8.4–25.7)
CALCIUM SERPL-MCNC: 8.1 MG/DL (ref 8.8–10)
CHLORIDE SERPL-SCNC: 108 MMOL/L (ref 98–107)
CHOLEST SERPL-MCNC: 115 MG/DL
CHOLEST/HDLC SERPL: 4 {RATIO} (ref 0–5)
CO2 SERPL-SCNC: 25 MMOL/L (ref 23–31)
CREAT SERPL-MCNC: 0.67 MG/DL (ref 0.73–1.18)
EOSINOPHIL # BLD AUTO: 0.45 X10(3)/MCL (ref 0–0.9)
EOSINOPHIL NFR BLD AUTO: 4 %
ERYTHROCYTE [DISTWIDTH] IN BLOOD BY AUTOMATED COUNT: 15.6 % (ref 11.5–17)
GFR SERPLBLD CREATININE-BSD FMLA CKD-EPI: >60 MLS/MIN/1.73/M2
GLOBULIN SER-MCNC: 3.2 GM/DL (ref 2.4–3.5)
GLUCOSE SERPL-MCNC: 61 MG/DL (ref 82–115)
HCT VFR BLD AUTO: 40.6 % (ref 42–52)
HDLC SERPL-MCNC: 32 MG/DL (ref 35–60)
HGB BLD-MCNC: 13.5 G/DL (ref 14–18)
IMM GRANULOCYTES # BLD AUTO: 0.04 X10(3)/MCL (ref 0–0.04)
IMM GRANULOCYTES NFR BLD AUTO: 0.4 %
LDLC SERPL CALC-MCNC: 60 MG/DL (ref 50–140)
LYMPHOCYTES # BLD AUTO: 2.68 X10(3)/MCL (ref 0.6–4.6)
LYMPHOCYTES NFR BLD AUTO: 23.9 %
MCH RBC QN AUTO: 32.5 PG (ref 27–31)
MCHC RBC AUTO-ENTMCNC: 33.3 G/DL (ref 33–36)
MCV RBC AUTO: 97.8 FL (ref 80–94)
MONOCYTES # BLD AUTO: 0.61 X10(3)/MCL (ref 0.1–1.3)
MONOCYTES NFR BLD AUTO: 5.4 %
NEUTROPHILS # BLD AUTO: 7.35 X10(3)/MCL (ref 2.1–9.2)
NEUTROPHILS NFR BLD AUTO: 65.4 %
NRBC BLD AUTO-RTO: 0 %
PLATELET # BLD AUTO: 238 X10(3)/MCL (ref 130–400)
PMV BLD AUTO: 10.9 FL (ref 7.4–10.4)
POTASSIUM SERPL-SCNC: 4 MMOL/L (ref 3.5–5.1)
PROT SERPL-MCNC: 5.7 GM/DL (ref 5.8–7.6)
RBC # BLD AUTO: 4.15 X10(6)/MCL (ref 4.7–6.1)
SODIUM SERPL-SCNC: 145 MMOL/L (ref 136–145)
TRIGL SERPL-MCNC: 116 MG/DL (ref 34–140)
VLDLC SERPL CALC-MCNC: 23 MG/DL
WBC # SPEC AUTO: 11.23 X10(3)/MCL (ref 4.5–11.5)

## 2023-09-06 PROCEDURE — 80053 COMPREHEN METABOLIC PANEL: CPT | Performed by: INTERNAL MEDICINE

## 2023-09-06 PROCEDURE — 80061 LIPID PANEL: CPT | Performed by: INTERNAL MEDICINE

## 2023-09-06 PROCEDURE — 85025 COMPLETE CBC W/AUTO DIFF WBC: CPT | Performed by: INTERNAL MEDICINE

## 2024-01-02 ENCOUNTER — LAB REQUISITION (OUTPATIENT)
Dept: LAB | Facility: HOSPITAL | Age: 79
End: 2024-01-02
Payer: MEDICARE

## 2024-01-02 DIAGNOSIS — R39.15 URGENCY OF URINATION: ICD-10-CM

## 2024-01-02 DIAGNOSIS — R30.0 DYSURIA: ICD-10-CM

## 2024-01-02 LAB
APPEARANCE UR: ABNORMAL
BACTERIA #/AREA URNS AUTO: ABNORMAL /HPF
BILIRUB UR QL STRIP.AUTO: NEGATIVE
COLOR UR AUTO: YELLOW
GLUCOSE UR QL STRIP.AUTO: NEGATIVE
KETONES UR QL STRIP.AUTO: NEGATIVE
LEUKOCYTE ESTERASE UR QL STRIP.AUTO: ABNORMAL
MUCOUS THREADS URNS QL MICRO: ABNORMAL /LPF
NITRITE UR QL STRIP.AUTO: NEGATIVE
PH UR STRIP.AUTO: 6 [PH]
PROT UR QL STRIP.AUTO: 30
RBC #/AREA URNS AUTO: ABNORMAL /HPF
RBC UR QL AUTO: ABNORMAL
SP GR UR STRIP.AUTO: >=1.03 (ref 1–1.03)
SQUAMOUS #/AREA URNS AUTO: ABNORMAL /HPF
UROBILINOGEN UR STRIP-ACNC: 0.2
WBC #/AREA URNS AUTO: ABNORMAL /HPF

## 2024-01-02 PROCEDURE — 81003 URINALYSIS AUTO W/O SCOPE: CPT | Performed by: INTERNAL MEDICINE

## 2024-01-02 PROCEDURE — 87086 URINE CULTURE/COLONY COUNT: CPT | Performed by: INTERNAL MEDICINE

## 2024-01-03 ENCOUNTER — LAB REQUISITION (OUTPATIENT)
Dept: LAB | Facility: HOSPITAL | Age: 79
End: 2024-01-03
Payer: MEDICARE

## 2024-01-03 DIAGNOSIS — R30.0 DYSURIA: ICD-10-CM

## 2024-01-03 DIAGNOSIS — R39.15 URGENCY OF URINATION: ICD-10-CM

## 2024-01-03 LAB — PSA SERPL-MCNC: 9.99 NG/ML

## 2024-01-03 PROCEDURE — 84153 ASSAY OF PSA TOTAL: CPT | Performed by: INTERNAL MEDICINE

## 2024-01-06 LAB — BACTERIA UR CULT: ABNORMAL

## 2024-02-05 ENCOUNTER — LAB REQUISITION (OUTPATIENT)
Dept: LAB | Facility: HOSPITAL | Age: 79
End: 2024-02-05
Payer: MEDICARE

## 2024-02-05 DIAGNOSIS — R30.0 DYSURIA: ICD-10-CM

## 2024-02-05 LAB — PSA SERPL-MCNC: 1.22 NG/ML

## 2024-02-05 PROCEDURE — 84153 ASSAY OF PSA TOTAL: CPT | Performed by: INTERNAL MEDICINE

## 2024-03-11 ENCOUNTER — LAB REQUISITION (OUTPATIENT)
Dept: LAB | Facility: HOSPITAL | Age: 79
End: 2024-03-11
Payer: MEDICARE

## 2024-03-11 DIAGNOSIS — E78.5 HYPERLIPIDEMIA, UNSPECIFIED: ICD-10-CM

## 2024-03-11 DIAGNOSIS — I10 ESSENTIAL (PRIMARY) HYPERTENSION: ICD-10-CM

## 2024-03-11 LAB
ALBUMIN SERPL-MCNC: 2.6 G/DL (ref 3.4–4.8)
ALBUMIN/GLOB SERPL: 0.9 RATIO (ref 1.1–2)
ALP SERPL-CCNC: 62 UNIT/L (ref 40–150)
ALT SERPL-CCNC: 8 UNIT/L (ref 0–55)
AST SERPL-CCNC: 15 UNIT/L (ref 5–34)
BASOPHILS # BLD AUTO: 0.07 X10(3)/MCL
BASOPHILS NFR BLD AUTO: 1 %
BILIRUB SERPL-MCNC: 0.4 MG/DL
BUN SERPL-MCNC: 17.1 MG/DL (ref 8.4–25.7)
CALCIUM SERPL-MCNC: 8.3 MG/DL (ref 8.8–10)
CHLORIDE SERPL-SCNC: 109 MMOL/L (ref 98–107)
CHOLEST SERPL-MCNC: 114 MG/DL
CHOLEST/HDLC SERPL: 4 {RATIO} (ref 0–5)
CO2 SERPL-SCNC: 26 MMOL/L (ref 23–31)
CREAT SERPL-MCNC: 0.78 MG/DL (ref 0.73–1.18)
EOSINOPHIL # BLD AUTO: 0.3 X10(3)/MCL (ref 0–0.9)
EOSINOPHIL NFR BLD AUTO: 4.4 %
ERYTHROCYTE [DISTWIDTH] IN BLOOD BY AUTOMATED COUNT: 14.7 % (ref 11.5–17)
GFR SERPLBLD CREATININE-BSD FMLA CKD-EPI: >60 MLS/MIN/1.73/M2
GLOBULIN SER-MCNC: 3 GM/DL (ref 2.4–3.5)
GLUCOSE SERPL-MCNC: 73 MG/DL (ref 82–115)
HCT VFR BLD AUTO: 37.2 % (ref 42–52)
HDLC SERPL-MCNC: 30 MG/DL (ref 35–60)
HGB BLD-MCNC: 12.5 G/DL (ref 14–18)
IMM GRANULOCYTES # BLD AUTO: 0.02 X10(3)/MCL (ref 0–0.04)
IMM GRANULOCYTES NFR BLD AUTO: 0.3 %
LDLC SERPL CALC-MCNC: 43 MG/DL (ref 50–140)
LYMPHOCYTES # BLD AUTO: 2.32 X10(3)/MCL (ref 0.6–4.6)
LYMPHOCYTES NFR BLD AUTO: 33.8 %
MCH RBC QN AUTO: 32.2 PG (ref 27–31)
MCHC RBC AUTO-ENTMCNC: 33.6 G/DL (ref 33–36)
MCV RBC AUTO: 95.9 FL (ref 80–94)
MONOCYTES # BLD AUTO: 0.51 X10(3)/MCL (ref 0.1–1.3)
MONOCYTES NFR BLD AUTO: 7.4 %
NEUTROPHILS # BLD AUTO: 3.65 X10(3)/MCL (ref 2.1–9.2)
NEUTROPHILS NFR BLD AUTO: 53.1 %
NRBC BLD AUTO-RTO: 0 %
PLATELET # BLD AUTO: 255 X10(3)/MCL (ref 130–400)
PMV BLD AUTO: 10.8 FL (ref 7.4–10.4)
POTASSIUM SERPL-SCNC: 4.2 MMOL/L (ref 3.5–5.1)
PROT SERPL-MCNC: 5.6 GM/DL (ref 5.8–7.6)
RBC # BLD AUTO: 3.88 X10(6)/MCL (ref 4.7–6.1)
SODIUM SERPL-SCNC: 144 MMOL/L (ref 136–145)
TRIGL SERPL-MCNC: 207 MG/DL (ref 34–140)
VLDLC SERPL CALC-MCNC: 41 MG/DL
WBC # SPEC AUTO: 6.87 X10(3)/MCL (ref 4.5–11.5)

## 2024-03-11 PROCEDURE — 80061 LIPID PANEL: CPT | Performed by: INTERNAL MEDICINE

## 2024-03-11 PROCEDURE — 80053 COMPREHEN METABOLIC PANEL: CPT | Performed by: INTERNAL MEDICINE

## 2024-03-11 PROCEDURE — 85025 COMPLETE CBC W/AUTO DIFF WBC: CPT | Performed by: INTERNAL MEDICINE

## 2024-03-14 ENCOUNTER — OUTSIDE PLACE OF SERVICE (OUTPATIENT)
Dept: FAMILY MEDICINE | Facility: CLINIC | Age: 79
End: 2024-03-14
Payer: MEDICARE

## 2024-03-14 PROCEDURE — 99223 1ST HOSP IP/OBS HIGH 75: CPT | Mod: ,,, | Performed by: FAMILY MEDICINE

## 2024-03-18 ENCOUNTER — OUTSIDE PLACE OF SERVICE (OUTPATIENT)
Dept: FAMILY MEDICINE | Facility: CLINIC | Age: 79
End: 2024-03-18
Payer: MEDICARE

## 2024-03-18 PROCEDURE — 99231 SBSQ HOSP IP/OBS SF/LOW 25: CPT | Mod: ,,, | Performed by: FAMILY MEDICINE

## 2024-04-01 ENCOUNTER — LAB REQUISITION (OUTPATIENT)
Dept: LAB | Facility: HOSPITAL | Age: 79
End: 2024-04-01
Payer: MEDICARE

## 2024-04-01 DIAGNOSIS — I10 ESSENTIAL (PRIMARY) HYPERTENSION: ICD-10-CM

## 2024-04-01 LAB
ALBUMIN SERPL-MCNC: 2.9 G/DL (ref 3.4–4.8)
ALBUMIN/GLOB SERPL: 0.9 RATIO (ref 1.1–2)
ALP SERPL-CCNC: 87 UNIT/L (ref 40–150)
ALT SERPL-CCNC: 12 UNIT/L (ref 0–55)
AST SERPL-CCNC: 16 UNIT/L (ref 5–34)
BASOPHILS # BLD AUTO: 0.08 X10(3)/MCL
BASOPHILS NFR BLD AUTO: 1.3 %
BILIRUB SERPL-MCNC: 0.4 MG/DL
BUN SERPL-MCNC: 18.6 MG/DL (ref 8.4–25.7)
CALCIUM SERPL-MCNC: 8.3 MG/DL (ref 8.8–10)
CHLORIDE SERPL-SCNC: 108 MMOL/L (ref 98–107)
CO2 SERPL-SCNC: 28 MMOL/L (ref 23–31)
CREAT SERPL-MCNC: 0.76 MG/DL (ref 0.73–1.18)
EOSINOPHIL # BLD AUTO: 0.4 X10(3)/MCL (ref 0–0.9)
EOSINOPHIL NFR BLD AUTO: 6.3 %
ERYTHROCYTE [DISTWIDTH] IN BLOOD BY AUTOMATED COUNT: 15.2 % (ref 11.5–17)
GFR SERPLBLD CREATININE-BSD FMLA CKD-EPI: >60 MLS/MIN/1.73/M2
GLOBULIN SER-MCNC: 3.1 GM/DL (ref 2.4–3.5)
GLUCOSE SERPL-MCNC: 80 MG/DL (ref 82–115)
HCT VFR BLD AUTO: 37.8 % (ref 42–52)
HGB BLD-MCNC: 12.4 G/DL (ref 14–18)
IMM GRANULOCYTES # BLD AUTO: 0.02 X10(3)/MCL (ref 0–0.04)
IMM GRANULOCYTES NFR BLD AUTO: 0.3 %
LYMPHOCYTES # BLD AUTO: 2.22 X10(3)/MCL (ref 0.6–4.6)
LYMPHOCYTES NFR BLD AUTO: 35 %
MCH RBC QN AUTO: 32.5 PG (ref 27–31)
MCHC RBC AUTO-ENTMCNC: 32.8 G/DL (ref 33–36)
MCV RBC AUTO: 99.2 FL (ref 80–94)
MONOCYTES # BLD AUTO: 0.56 X10(3)/MCL (ref 0.1–1.3)
MONOCYTES NFR BLD AUTO: 8.8 %
NEUTROPHILS # BLD AUTO: 3.07 X10(3)/MCL (ref 2.1–9.2)
NEUTROPHILS NFR BLD AUTO: 48.3 %
NRBC BLD AUTO-RTO: 0 %
PLATELET # BLD AUTO: 270 X10(3)/MCL (ref 130–400)
PMV BLD AUTO: 10.8 FL (ref 7.4–10.4)
POTASSIUM SERPL-SCNC: 3.2 MMOL/L (ref 3.5–5.1)
PROT SERPL-MCNC: 6 GM/DL (ref 5.8–7.6)
RBC # BLD AUTO: 3.81 X10(6)/MCL (ref 4.7–6.1)
SODIUM SERPL-SCNC: 145 MMOL/L (ref 136–145)
WBC # SPEC AUTO: 6.35 X10(3)/MCL (ref 4.5–11.5)

## 2024-04-01 PROCEDURE — 85025 COMPLETE CBC W/AUTO DIFF WBC: CPT | Performed by: INTERNAL MEDICINE

## 2024-04-01 PROCEDURE — 80053 COMPREHEN METABOLIC PANEL: CPT | Performed by: INTERNAL MEDICINE

## 2024-04-08 ENCOUNTER — LAB REQUISITION (OUTPATIENT)
Dept: LAB | Facility: HOSPITAL | Age: 79
End: 2024-04-08
Payer: MEDICARE

## 2024-04-08 DIAGNOSIS — E87.6 HYPOKALEMIA: ICD-10-CM

## 2024-04-08 LAB
ANION GAP SERPL CALC-SCNC: 8 MEQ/L
BUN SERPL-MCNC: 19.3 MG/DL (ref 8.4–25.7)
CALCIUM SERPL-MCNC: 8.8 MG/DL (ref 8.8–10)
CHLORIDE SERPL-SCNC: 109 MMOL/L (ref 98–107)
CO2 SERPL-SCNC: 26 MMOL/L (ref 23–31)
CREAT SERPL-MCNC: 0.82 MG/DL (ref 0.73–1.18)
CREAT/UREA NIT SERPL: 24
GFR SERPLBLD CREATININE-BSD FMLA CKD-EPI: >60 MLS/MIN/1.73/M2
GLUCOSE SERPL-MCNC: 84 MG/DL (ref 82–115)
POTASSIUM SERPL-SCNC: 4 MMOL/L (ref 3.5–5.1)
SODIUM SERPL-SCNC: 143 MMOL/L (ref 136–145)

## 2024-04-08 PROCEDURE — 80048 BASIC METABOLIC PNL TOTAL CA: CPT | Performed by: INTERNAL MEDICINE

## 2024-05-14 ENCOUNTER — LAB REQUISITION (OUTPATIENT)
Dept: LAB | Facility: HOSPITAL | Age: 79
End: 2024-05-14
Payer: MEDICARE

## 2024-05-14 DIAGNOSIS — N40.0 BENIGN PROSTATIC HYPERPLASIA WITHOUT LOWER URINARY TRACT SYMPTOMS: ICD-10-CM

## 2024-05-14 LAB — PSA SERPL-MCNC: 0.82 NG/ML

## 2024-05-14 PROCEDURE — 84153 ASSAY OF PSA TOTAL: CPT | Performed by: INTERNAL MEDICINE

## 2024-08-20 ENCOUNTER — HOSPITAL ENCOUNTER (INPATIENT)
Facility: HOSPITAL | Age: 79
LOS: 3 days | Discharge: NURSING, REHAB OR LONG-TERM CARE | DRG: 871 | End: 2024-08-23
Attending: EMERGENCY MEDICINE | Admitting: INTERNAL MEDICINE
Payer: MEDICARE

## 2024-08-20 DIAGNOSIS — A41.9 SEVERE SEPSIS: Primary | ICD-10-CM

## 2024-08-20 DIAGNOSIS — R79.89 ELEVATED LACTIC ACID LEVEL: ICD-10-CM

## 2024-08-20 DIAGNOSIS — R06.02 SHORTNESS OF BREATH: ICD-10-CM

## 2024-08-20 DIAGNOSIS — J18.9 PNEUMONIA OF LEFT LOWER LOBE DUE TO INFECTIOUS ORGANISM: ICD-10-CM

## 2024-08-20 DIAGNOSIS — N17.9 AKI (ACUTE KIDNEY INJURY): ICD-10-CM

## 2024-08-20 DIAGNOSIS — R65.20 SEVERE SEPSIS: Primary | ICD-10-CM

## 2024-08-20 LAB
ALBUMIN SERPL-MCNC: 3 G/DL (ref 3.4–4.8)
ALBUMIN/GLOB SERPL: 0.8 RATIO (ref 1.1–2)
ALP SERPL-CCNC: 57 UNIT/L (ref 40–150)
ALT SERPL-CCNC: 12 UNIT/L (ref 0–55)
ANION GAP SERPL CALC-SCNC: 11 MEQ/L
AST SERPL-CCNC: 26 UNIT/L (ref 5–34)
BACTERIA #/AREA URNS AUTO: ABNORMAL /HPF
BASOPHILS # BLD AUTO: 0.04 X10(3)/MCL
BASOPHILS NFR BLD AUTO: 0.4 %
BILIRUB SERPL-MCNC: 0.7 MG/DL
BILIRUB UR QL STRIP.AUTO: NEGATIVE
BUN SERPL-MCNC: 32.2 MG/DL (ref 8.4–25.7)
CALCIUM SERPL-MCNC: 8.7 MG/DL (ref 8.8–10)
CHLORIDE SERPL-SCNC: 112 MMOL/L (ref 98–107)
CLARITY UR: CLEAR
CO2 SERPL-SCNC: 19 MMOL/L (ref 23–31)
COLOR UR AUTO: YELLOW
CREAT SERPL-MCNC: 1.71 MG/DL (ref 0.73–1.18)
CREAT/UREA NIT SERPL: 19
EOSINOPHIL # BLD AUTO: 0.01 X10(3)/MCL (ref 0–0.9)
EOSINOPHIL NFR BLD AUTO: 0.1 %
ERYTHROCYTE [DISTWIDTH] IN BLOOD BY AUTOMATED COUNT: 14.1 % (ref 11.5–17)
FLUAV AG UPPER RESP QL IA.RAPID: NOT DETECTED
FLUBV AG UPPER RESP QL IA.RAPID: NOT DETECTED
GFR SERPLBLD CREATININE-BSD FMLA CKD-EPI: 41 ML/MIN/1.73/M2
GLOBULIN SER-MCNC: 3.7 GM/DL (ref 2.4–3.5)
GLUCOSE SERPL-MCNC: 130 MG/DL (ref 82–115)
GLUCOSE UR QL STRIP: NORMAL
HCT VFR BLD AUTO: 41.1 % (ref 42–52)
HGB BLD-MCNC: 13.9 G/DL (ref 14–18)
HGB UR QL STRIP: ABNORMAL
HYALINE CASTS #/AREA URNS LPF: ABNORMAL /LPF
IMM GRANULOCYTES # BLD AUTO: 0.03 X10(3)/MCL (ref 0–0.04)
IMM GRANULOCYTES NFR BLD AUTO: 0.3 %
KETONES UR QL STRIP: ABNORMAL
LACTATE SERPL-SCNC: 2.3 MMOL/L (ref 0.5–2.2)
LACTATE SERPL-SCNC: 2.3 MMOL/L (ref 0.5–2.2)
LEUKOCYTE ESTERASE UR QL STRIP: NEGATIVE
LYMPHOCYTES # BLD AUTO: 0.84 X10(3)/MCL (ref 0.6–4.6)
LYMPHOCYTES NFR BLD AUTO: 9.3 %
MCH RBC QN AUTO: 32.2 PG (ref 27–31)
MCHC RBC AUTO-ENTMCNC: 33.8 G/DL (ref 33–36)
MCV RBC AUTO: 95.1 FL (ref 80–94)
MONOCYTES # BLD AUTO: 0.6 X10(3)/MCL (ref 0.1–1.3)
MONOCYTES NFR BLD AUTO: 6.6 %
MRSA PCR SCRN (OHS): DETECTED
MUCOUS THREADS URNS QL MICRO: ABNORMAL /LPF
NEUTROPHILS # BLD AUTO: 7.55 X10(3)/MCL (ref 2.1–9.2)
NEUTROPHILS NFR BLD AUTO: 83.3 %
NITRITE UR QL STRIP: NEGATIVE
NRBC BLD AUTO-RTO: 0 %
OHS QRS DURATION: 82 MS
OHS QTC CALCULATION: 433 MS
PH UR STRIP: 5.5 [PH]
PLATELET # BLD AUTO: 172 X10(3)/MCL (ref 130–400)
PMV BLD AUTO: 9.9 FL (ref 7.4–10.4)
POTASSIUM SERPL-SCNC: 3.9 MMOL/L (ref 3.5–5.1)
PROT SERPL-MCNC: 6.7 GM/DL (ref 5.8–7.6)
PROT UR QL STRIP: ABNORMAL
RBC # BLD AUTO: 4.32 X10(6)/MCL (ref 4.7–6.1)
RBC #/AREA URNS AUTO: ABNORMAL /HPF
SARS-COV-2 RNA RESP QL NAA+PROBE: NOT DETECTED
SODIUM SERPL-SCNC: 142 MMOL/L (ref 136–145)
SP GR UR STRIP.AUTO: 1.03 (ref 1–1.03)
SQUAMOUS #/AREA URNS LPF: ABNORMAL /HPF
UROBILINOGEN UR STRIP-ACNC: 2
WBC # BLD AUTO: 9.07 X10(3)/MCL (ref 4.5–11.5)
WBC #/AREA URNS AUTO: ABNORMAL /HPF

## 2024-08-20 PROCEDURE — 96366 THER/PROPH/DIAG IV INF ADDON: CPT

## 2024-08-20 PROCEDURE — 25000003 PHARM REV CODE 250: Performed by: INTERNAL MEDICINE

## 2024-08-20 PROCEDURE — 96375 TX/PRO/DX INJ NEW DRUG ADDON: CPT

## 2024-08-20 PROCEDURE — 20000000 HC ICU ROOM

## 2024-08-20 PROCEDURE — 96365 THER/PROPH/DIAG IV INF INIT: CPT

## 2024-08-20 PROCEDURE — 83605 ASSAY OF LACTIC ACID: CPT | Performed by: EMERGENCY MEDICINE

## 2024-08-20 PROCEDURE — 81001 URINALYSIS AUTO W/SCOPE: CPT | Performed by: EMERGENCY MEDICINE

## 2024-08-20 PROCEDURE — 63600175 PHARM REV CODE 636 W HCPCS

## 2024-08-20 PROCEDURE — 0240U COVID/FLU A&B PCR: CPT | Performed by: EMERGENCY MEDICINE

## 2024-08-20 PROCEDURE — 81015 MICROSCOPIC EXAM OF URINE: CPT | Performed by: EMERGENCY MEDICINE

## 2024-08-20 PROCEDURE — 96360 HYDRATION IV INFUSION INIT: CPT | Mod: 59

## 2024-08-20 PROCEDURE — 96367 TX/PROPH/DG ADDL SEQ IV INF: CPT

## 2024-08-20 PROCEDURE — 96361 HYDRATE IV INFUSION ADD-ON: CPT

## 2024-08-20 PROCEDURE — 87641 MR-STAPH DNA AMP PROBE: CPT | Performed by: INTERNAL MEDICINE

## 2024-08-20 PROCEDURE — 87040 BLOOD CULTURE FOR BACTERIA: CPT | Performed by: EMERGENCY MEDICINE

## 2024-08-20 PROCEDURE — 93010 ELECTROCARDIOGRAM REPORT: CPT | Mod: ,,, | Performed by: INTERNAL MEDICINE

## 2024-08-20 PROCEDURE — 80053 COMPREHEN METABOLIC PANEL: CPT | Performed by: EMERGENCY MEDICINE

## 2024-08-20 PROCEDURE — 63600175 PHARM REV CODE 636 W HCPCS: Performed by: EMERGENCY MEDICINE

## 2024-08-20 PROCEDURE — 85025 COMPLETE CBC W/AUTO DIFF WBC: CPT | Performed by: EMERGENCY MEDICINE

## 2024-08-20 PROCEDURE — 25000003 PHARM REV CODE 250: Performed by: EMERGENCY MEDICINE

## 2024-08-20 PROCEDURE — 63600175 PHARM REV CODE 636 W HCPCS: Performed by: INTERNAL MEDICINE

## 2024-08-20 PROCEDURE — 87086 URINE CULTURE/COLONY COUNT: CPT | Performed by: INTERNAL MEDICINE

## 2024-08-20 PROCEDURE — 93005 ELECTROCARDIOGRAM TRACING: CPT

## 2024-08-20 PROCEDURE — 99285 EMERGENCY DEPT VISIT HI MDM: CPT | Mod: 25

## 2024-08-20 PROCEDURE — 27000221 HC OXYGEN, UP TO 24 HOURS

## 2024-08-20 RX ORDER — ENOXAPARIN SODIUM 100 MG/ML
40 INJECTION SUBCUTANEOUS EVERY 24 HOURS
Status: DISCONTINUED | OUTPATIENT
Start: 2024-08-20 | End: 2024-08-23 | Stop reason: HOSPADM

## 2024-08-20 RX ORDER — FAMOTIDINE 20 MG/1
20 TABLET, FILM COATED ORAL DAILY
Status: DISCONTINUED | OUTPATIENT
Start: 2024-08-21 | End: 2024-08-23 | Stop reason: HOSPADM

## 2024-08-20 RX ORDER — NOREPINEPHRINE BITARTRATE/D5W 8 MG/250ML
0-3 PLASTIC BAG, INJECTION (ML) INTRAVENOUS CONTINUOUS
Status: DISCONTINUED | OUTPATIENT
Start: 2024-08-20 | End: 2024-08-21

## 2024-08-20 RX ORDER — SODIUM CHLORIDE 0.9 % (FLUSH) 0.9 %
10 SYRINGE (ML) INJECTION
Status: DISCONTINUED | OUTPATIENT
Start: 2024-08-20 | End: 2024-08-23 | Stop reason: HOSPADM

## 2024-08-20 RX ORDER — MUPIROCIN 20 MG/G
OINTMENT TOPICAL 2 TIMES DAILY
Status: DISCONTINUED | OUTPATIENT
Start: 2024-08-20 | End: 2024-08-23 | Stop reason: HOSPADM

## 2024-08-20 RX ORDER — HYOSCYAMINE SULFATE 0.12 MG/1
0.12 TABLET SUBLINGUAL
Status: COMPLETED | OUTPATIENT
Start: 2024-08-20 | End: 2024-08-20

## 2024-08-20 RX ORDER — ACETAMINOPHEN 500 MG
1000 TABLET ORAL
Status: COMPLETED | OUTPATIENT
Start: 2024-08-20 | End: 2024-08-20

## 2024-08-20 RX ORDER — SODIUM CHLORIDE, SODIUM LACTATE, POTASSIUM CHLORIDE, CALCIUM CHLORIDE 600; 310; 30; 20 MG/100ML; MG/100ML; MG/100ML; MG/100ML
INJECTION, SOLUTION INTRAVENOUS CONTINUOUS
Status: DISCONTINUED | OUTPATIENT
Start: 2024-08-20 | End: 2024-08-21

## 2024-08-20 RX ADMIN — MUPIROCIN: 20 OINTMENT TOPICAL at 08:08

## 2024-08-20 RX ADMIN — PIPERACILLIN SODIUM AND TAZOBACTAM SODIUM 4.5 G: 4; .5 INJECTION, POWDER, LYOPHILIZED, FOR SOLUTION INTRAVENOUS at 11:08

## 2024-08-20 RX ADMIN — ENOXAPARIN SODIUM 40 MG: 40 INJECTION SUBCUTANEOUS at 05:08

## 2024-08-20 RX ADMIN — SODIUM CHLORIDE, POTASSIUM CHLORIDE, SODIUM LACTATE AND CALCIUM CHLORIDE: 600; 310; 30; 20 INJECTION, SOLUTION INTRAVENOUS at 01:08

## 2024-08-20 RX ADMIN — VANCOMYCIN HYDROCHLORIDE 1750 MG: 500 INJECTION, POWDER, LYOPHILIZED, FOR SOLUTION INTRAVENOUS at 09:08

## 2024-08-20 RX ADMIN — SODIUM CHLORIDE, POTASSIUM CHLORIDE, SODIUM LACTATE AND CALCIUM CHLORIDE 1000 ML: 600; 310; 30; 20 INJECTION, SOLUTION INTRAVENOUS at 11:08

## 2024-08-20 RX ADMIN — ACETAMINOPHEN 1000 MG: 500 TABLET ORAL at 08:08

## 2024-08-20 RX ADMIN — SODIUM CHLORIDE, POTASSIUM CHLORIDE, SODIUM LACTATE AND CALCIUM CHLORIDE 2190 ML: 600; 310; 30; 20 INJECTION, SOLUTION INTRAVENOUS at 08:08

## 2024-08-20 RX ADMIN — NOREPINEPHRINE BITARTRATE 0.02 MCG/KG/MIN: 8 INJECTION, SOLUTION INTRAVENOUS at 12:08

## 2024-08-20 RX ADMIN — PIPERACILLIN SODIUM AND TAZOBACTAM SODIUM 4.5 G: 4; .5 INJECTION, POWDER, LYOPHILIZED, FOR SOLUTION INTRAVENOUS at 04:08

## 2024-08-20 RX ADMIN — HYOSCYAMINE SULFATE 0.12 MG: 0.12 TABLET SUBLINGUAL at 10:08

## 2024-08-20 RX ADMIN — PIPERACILLIN AND TAZOBACTAM 4.5 G: 4; .5 INJECTION, POWDER, LYOPHILIZED, FOR SOLUTION INTRAVENOUS; PARENTERAL at 08:08

## 2024-08-20 NOTE — PROGRESS NOTES
"Pharmacokinetic Initial Assessment: IV Vancomycin    Assessment/Plan:    Initiate intravenous vancomycin with loading dose of 1750 mg once with subsequent doses when random concentrations are less than 20 mcg/mL  Desired empiric serum trough concentration is 15 to 20 mcg/mL  Draw vancomycin random level on 08/21 at 0430.  Pharmacy will continue to follow and monitor vancomycin.      Please contact pharmacy at extension [phone] with any questions regarding this assessment.     Thank you for the consult,   Shannon Ross       Patient brief summary:  Richard Carter is a 78 y.o. male initiated on antimicrobial therapy with IV Vancomycin for treatment of suspected sepsis    Drug Allergies:   Review of patient's allergies indicates:  No Known Allergies    Actual Body Weight:   72.6 kg    Renal Function:   Estimated Creatinine Clearance: 37.7 mL/min (A) (based on SCr of 1.71 mg/dL (H)).,     Dialysis Method (if applicable):  CRUZITO (Scr  0.82-->1.71)    CBC (last 72 hours):  Recent Labs   Lab Result Units 08/20/24  0819   WBC x10(3)/mcL 9.07   Hgb g/dL 13.9*   Hct % 41.1*   Platelet x10(3)/mcL 172   Mono % % 6.6   Eos % % 0.1   Basophil % % 0.4       Metabolic Panel (last 72 hours):  Recent Labs   Lab Result Units 08/20/24  0827 08/20/24  0839   Sodium mmol/L  --  142   Potassium mmol/L  --  3.9   Chloride mmol/L  --  112*   CO2 mmol/L  --  19*   Glucose mg/dL  --  130*   Glucose, UA  Normal  --    Blood Urea Nitrogen mg/dL  --  32.2*   Creatinine mg/dL  --  1.71*   Albumin g/dL  --  3.0*   Bilirubin Total mg/dL  --  0.7   ALP unit/L  --  57   AST unit/L  --  26   ALT unit/L  --  12       Drug levels (last 3 results):  No results for input(s): "VANCOMYCINRA", "VANCORANDOM", "VANCOMYCINPE", "VANCOPEAK", "VANCOMYCINTR", "VANCOTROUGH" in the last 72 hours.    Microbiologic Results:  Microbiology Results (last 7 days)       Procedure Component Value Units Date/Time    Respiratory Culture [[id number]]     Order Status: Sent " Specimen: Sputum     Blood Culture [[id number]] Collected: 08/20/24 0840    Order Status: Resulted Specimen: Blood Updated: 08/20/24 0844    Blood Culture [[id number]] Collected: 08/20/24 0839    Order Status: Resulted Specimen: Blood Updated: 08/20/24 0843

## 2024-08-20 NOTE — ED PROVIDER NOTES
Encounter Date: 8/20/2024       History     Chief Complaint   Patient presents with    Fever     Pt to ER via [facility] for fever.  Pt resides at [facility].  Has been increasingly altered for 3 days.  Hx of UTI     78-year-old male with history of hypertension and stroke presenting with fever.  Patient reports he has been on feeling well for the past few days.  States that he usually goes to [facility] on Mondays but was very nauseated and unable to go this week.  States he has been feeling weak and generally unwell.  He also reports a cough and shortness of breath.  EMS reports that patient had a fever of 101.5 at the nursing home and was hypoxic at 86% on their arrival.    The history is provided by the patient and the EMS personnel.     Review of patient's allergies indicates:  No Known Allergies  Past Medical History:   Diagnosis Date    CVA (cerebral vascular accident)     HTN (hypertension)      No past surgical history on file.  No family history on file.     Review of Systems   Constitutional:  Positive for fever.   Respiratory:  Positive for cough and shortness of breath.    Gastrointestinal:  Positive for nausea.       Physical Exam     Initial Vitals [08/20/24 0801]   BP Pulse Resp Temp SpO2   (!) 118/56 105 (!) 26 99.7 °F (37.6 °C) 95 %      MAP       --         Physical Exam    Nursing note and vitals reviewed.  Constitutional: He appears well-developed and well-nourished.   HENT:   Head: Normocephalic and atraumatic.   Right Ear: External ear normal.   Left Ear: External ear normal.   Nose: Nose normal.   Dry mucous membranes   Eyes: Conjunctivae and EOM are normal. Pupils are equal, round, and reactive to light.   Neck: Neck supple. No tracheal deviation present.   Cardiovascular:  Regular rhythm and normal heart sounds.           Tachycardic   Pulmonary/Chest:   Tachypneic, rhonchi in the left lower lung   Abdominal: Abdomen is soft. He exhibits no distension. There is no abdominal tenderness.    Musculoskeletal:         General: Normal range of motion.      Cervical back: Neck supple.     Neurological: He is alert and oriented to person, place, and time. He has normal strength. GCS score is 15. GCS eye subscore is 4. GCS verbal subscore is 5. GCS motor subscore is 6.   Skin: Skin is warm. Capillary refill takes less than 2 seconds. No pallor.   Psychiatric: His mood appears not anxious.         ED Course   Critical Care    Date/Time: 8/20/2024 12:47 PM    Performed by: Snehal Mandujano MD  Authorized by: Snehal Mandujano MD  Direct patient critical care time: 20 minutes  Additional history critical care time: 5 minutes  Ordering / reviewing critical care time: 5 minutes  Documentation critical care time: 5 minutes  Consulting other physicians critical care time: 5 minutes  Total critical care time (exclusive of procedural time) : 40 minutes  Critical care time was exclusive of separately billable procedures and treating other patients and teaching time.  Critical care was necessary to treat or prevent imminent or life-threatening deterioration of the following conditions: cardiac failure, dehydration, metabolic crisis, sepsis, renal failure, CNS failure or compromise and respiratory failure.  Critical care was time spent personally by me on the following activities: blood draw for specimens, development of treatment plan with patient or surrogate, interpretation of cardiac output measurements, evaluation of patient's response to treatment, examination of patient, obtaining history from patient or surrogate, ordering and performing treatments and interventions, ordering and review of laboratory studies, ordering and review of radiographic studies, re-evaluation of patient's condition and pulse oximetry.        Labs Reviewed   COMPREHENSIVE METABOLIC PANEL - Abnormal       Result Value    Sodium 142      Potassium 3.9      Chloride 112 (*)     CO2 19 (*)     Glucose 130 (*)     Blood Urea Nitrogen 32.2 (*)      Creatinine 1.71 (*)     Calcium 8.7 (*)     Protein Total 6.7      Albumin 3.0 (*)     Globulin 3.7 (*)     Albumin/Globulin Ratio 0.8 (*)     Bilirubin Total 0.7      ALP 57      ALT 12      AST 26      eGFR 41      Anion Gap 11.0      BUN/Creatinine Ratio 19     LACTIC ACID, PLASMA - Abnormal    Lactic Acid Level 2.3 (*)    URINALYSIS, REFLEX TO URINE CULTURE - Abnormal    Color, UA Yellow      Appearance, UA Clear      Specific Gravity, UA 1.033 (*)     pH, UA 5.5      Protein, UA 1+ (*)     Glucose, UA Normal      Ketones, UA 1+ (*)     Blood, UA 2+ (*)     Bilirubin, UA Negative      Urobilinogen, UA 2.0 (*)     Nitrites, UA Negative      Leukocyte Esterase, UA Negative      RBC, UA 0-5      WBC, UA 0-5      Bacteria, UA None Seen      Squamous Epithelial Cells, UA None Seen      Mucous, UA Trace (*)     Hyaline Casts, UA 11-20 (*)    CBC WITH DIFFERENTIAL - Abnormal    WBC 9.07      RBC 4.32 (*)     Hgb 13.9 (*)     Hct 41.1 (*)     MCV 95.1 (*)     MCH 32.2 (*)     MCHC 33.8      RDW 14.1      Platelet 172      MPV 9.9      Neut % 83.3      Lymph % 9.3      Mono % 6.6      Eos % 0.1      Basophil % 0.4      Lymph # 0.84      Neut # 7.55      Mono # 0.60      Eos # 0.01      Baso # 0.04      IG# 0.03      IG% 0.3      NRBC% 0.0     LACTIC ACID, PLASMA - Abnormal    Lactic Acid Level 2.3 (*)    COVID/FLU A&B PCR - Normal    Influenza A PCR Not Detected      Influenza B PCR Not Detected      SARS-CoV-2 PCR Not Detected      Narrative:     The Xpert Xpress SARS-CoV-2/FLU/RSV plus is a rapid, multiplexed real-time PCR test intended for the simultaneous qualitative detection and differentiation of SARS-CoV-2, Influenza A, Influenza B, and respiratory syncytial virus (RSV) viral RNA in either nasopharyngeal swab or nasal swab specimens.         BLOOD CULTURE [facility]   BLOOD CULTURE [facility]   CBC W/ AUTO DIFFERENTIAL    Narrative:     The following orders were created for panel order CBC auto differential.  Procedure                                Abnormality         Status                     ---------                               -----------         ------                     CBC with Differential[[id number]]       Abnormal            Final result                 Please view results for these tests on the individual orders.     EKG Readings: (Independently Interpreted)   Previous EKG: Compared with most recent EKG Rhythm: Sinus Tachycardia. Heart Rate: 106. Ectopy: No Ectopy. Conduction: Normal. T Waves Flipped: II, III, AVF, V3, V4, V5 and V6.   Performed at 8:34 a.m.       Imaging Results              X-Ray Chest AP Portable (Final result)  Result time 08/20/24 09:07:37      Final result by Good Tyler MD (08/20/24 09:07:37)                   Impression:      Confluent opacities in the left lower lobe infiltrate can not be excluded.    Otherwise no active pulmonary disease      Electronically signed by: Good Tyler  Date:    08/20/2024  Time:    09:07               Narrative:    EXAMINATION:  XR CHEST AP PORTABLE    CLINICAL HISTORY:  Sepsis;, .    COMPARISON:  February 6, 2021    FINDINGS:  Examination reveals mediastinal cardiac silhouette to be within normal limits right lung field is clear and free of gross infiltrates atelectasis or effusions confluent opacities identified at the left base early infiltrate cannot be completely excluded.    No other focal consolidative changes                                       Medications   piperacillin-tazobactam (ZOSYN) 4.5 g in D5W 100 mL IVPB (MB+) (0 g Intravenous Stopped 8/20/24 0917)   NORepinephrine 8 mg in dextrose 5% 250 mL infusion (0.02 mcg/kg/min × 72.6 kg Intravenous New Bag 8/20/24 1243)   lactated ringers bolus 2,190 mL (0 mLs Intravenous Stopped 8/20/24 0937)   vancomycin 1.75 g in 5 % dextrose 500 mL IVPB (0 mg Intravenous Stopped 8/20/24 1101)   acetaminophen tablet 1,000 mg (1,000 mg Oral Given 8/20/24 0850)   hyoscyamine SL tablet 0.125 mg (0.125 mg  Sublingual Given 8/20/24 1042)   lactated ringers bolus 1,000 mL (0 mLs Intravenous Stopped 8/20/24 1214)     Medical Decision Making  78-year-old male with fever and weak    Differential includes but not limited to sepsis, bacteremia, UTI, pneumonia, upper respiratory infection, dehydration, electrolyte abnormality      Pneumonia on exam and CXR   Given 30 ml/kg IVF bolus and additional 1L with continued hypotension   On 4L oxygen  Started levophed        Problems Addressed:  CRUZITO (acute kidney injury): acute illness or injury that poses a threat to life or bodily functions  Elevated lactic acid level: acute illness or injury that poses a threat to life or bodily functions  Pneumonia of left lower lobe due to infectious organism: acute illness or injury that poses a threat to life or bodily functions  Severe sepsis: acute illness or injury that poses a threat to life or bodily functions  Shortness of breath: acute illness or injury that poses a threat to life or bodily functions    Amount and/or Complexity of Data Reviewed  Labs: ordered. Decision-making details documented in ED Course.  Radiology: ordered. Decision-making details documented in ED Course.  ECG/medicine tests: ordered and independent interpretation performed. Decision-making details documented in ED Course.    Risk  OTC drugs.  Prescription drug management.  Drug therapy requiring intensive monitoring for toxicity.  Decision regarding hospitalization.               ED Course as of 08/20/24 1250   Tue Aug 20, 2024   0852 Lactic Acid Level(!): 2.3  Receiving IVF  [KM]   1024 Discussed plan for admission with patient and his son.  Patient was complaining of some bladder spasms since intermittent catheterization.  Will try Levsin to see if that relieves his pain. [KM]   1112 Still hypotensive, will give additional IVF and reassess   [KM]   1234 Blood pressure has remained low despite 30 mL/kg and an additional L bolus.  Will start Levophed and admit to  the ICU [KM]      ED Course User Index  [KM] Snehal Mandujano MD                           Clinical Impression:  Final diagnoses:  [R06.02] Shortness of breath  [A41.9, R65.20] Severe sepsis (Primary)  [J18.9] Pneumonia of left lower lobe due to infectious organism  [R79.89] Elevated lactic acid level  [N17.9] CRUZITO (acute kidney injury)          ED Disposition Condition    Admit Fair                Snehal Mandujano MD  08/20/24 5223

## 2024-08-20 NOTE — NURSING
Admission documentation completed by the admit nurse. Home med rec NOT complete. Pt did not elect for meds to bed with [facility]. 4 Eyes completed by the admit nurse. Pt resident at [facility].

## 2024-08-20 NOTE — PLAN OF CARE
Problem: Adult Inpatient Plan of Care  Goal: Plan of Care Review  Outcome: Adequate for Care Transition  Goal: Patient-Specific Goal (Individualized)  Outcome: Adequate for Care Transition  Goal: Absence of Hospital-Acquired Illness or Injury  Outcome: Adequate for Care Transition  Goal: Optimal Comfort and Wellbeing  Outcome: Adequate for Care Transition  Goal: Readiness for Transition of Care  Outcome: Adequate for Care Transition     Problem: Skin Injury Risk Increased  Goal: Skin Health and Integrity  Outcome: Adequate for Care Transition     Problem: Wound  Goal: Optimal Coping  Outcome: Adequate for Care Transition  Goal: Optimal Functional Ability  Outcome: Adequate for Care Transition  Goal: Absence of Infection Signs and Symptoms  Outcome: Adequate for Care Transition  Goal: Improved Oral Intake  Outcome: Adequate for Care Transition  Goal: Optimal Pain Control and Function  Outcome: Adequate for Care Transition  Goal: Skin Health and Integrity  Outcome: Adequate for Care Transition  Goal: Optimal Wound Healing  Outcome: Adequate for Care Transition

## 2024-08-20 NOTE — NURSING
Nurses Note -- 4 Eyes      8/20/2024   4:00 PM      Skin assessed during: Admit      [] No Altered Skin Integrity Present    [x]Prevention Measures Documented      [x] Yes- Altered Skin Integrity Present or Discovered   [x] LDA Added if Not in Epic (Describe Wound)   [x] New Altered Skin Integrity was Present on Admit and Documented in LDA   [x] Wound Image Taken    Wound Care Consulted? Yes    Attending Nurse:  JAISON Orta    Second RN/Staff Member:  JAISON Garduno

## 2024-08-20 NOTE — NURSING
Nurses Note -- 4 Eyes      8/20/2024   1:52 PM      Skin assessed during: Admit      [] No Altered Skin Integrity Present    []Prevention Measures Documented      [x] Yes- Altered Skin Integrity Present or Discovered   [x] LDA Added if Not in Epic (Describe Wound)   [x] New Altered Skin Integrity was Present on Admit and Documented in LDA   [x] Wound Image Taken    Wound Care Consulted? Yes    Attending Nurse:  Juan Jose Norton RN/Staff Member:   Antonia

## 2024-08-20 NOTE — H&P
[facility] - Emergency Dept  Pulmonary Critical Care Note    Patient Name: Richard Carter  MRN: [id number]  Admission Date: 8/20/2024  Hospital Length of Stay: 0 days  Code Status: No Order  Attending Provider: Lopez Gilliam Jr., MD,*  Primary Care Provider: No primary care provider on file.     Subjective:     HPI: Richard Carter is a 78 y.o. male with a pmh of CVA with residual left sided deficits, hearing impairment, and hypertension presents from [facility] with a 2-3 hx of AMS, shortness of breath, and fever. Patient's son at bedside and provides the majority of the history. Reports patient had been acting unlike himself. The nursing home staff noted hypoxemia at 86% and fever TMAX of 101.5.   Upon arrival, patient was hypotensive 118/56, tachycardic 105, tachypneic 26, and afebrile. Patient arrived on 4L NC sating at 95%. No history of COPD. WBC 9.07 PT 15.0, INR 1.2, BUN/Cr 32.2/1.71, lactate of 2.3,  UA was negative for infection. CXR with left sided infiltrates. He received the appropriate flud resuscitation at 30ml/kg, zosyn, and vancomycin. His map remained in the low 60s, so levophed was initiated. Admitted to the ICU for pressor support.      Hospital Course/Significant events:      24 Hour Interval History:  pending    Past Medical History:   Diagnosis Date    CVA (cerebral vascular accident)     HTN (hypertension)        No past surgical history on file.    Social History     Socioeconomic History    Marital status:            No current outpatient medications    Current Inpatient Medications   piperacillin-tazobactam (Zosyn) IV (PEDS and ADULTS) (extended infusion is not appropriate)  4.5 g Intravenous Q8H       Current Intravenous Infusions   NORepinephrine bitartrate-D5W  0-3 mcg/kg/min Intravenous Continuous 2.7 mL/hr at 08/20/24 1243 0.02 mcg/kg/min at 08/20/24 1243         Review of Systems   Constitutional:  Positive for chills and fever.    Respiratory:  Positive for cough, sputum production and shortness of breath.    Cardiovascular:  Negative for chest pain and palpitations.   Gastrointestinal:  Positive for nausea. Negative for diarrhea and vomiting.   Neurological:  Positive for weakness (baseline left sided from stroke).          Objective:     No intake or output data in the 24 hours ending 08/20/24 1306      Vital Signs (Most Recent):  Temp: 99.7 °F (37.6 °C) (08/20/24 0801)  Pulse: 70 (08/20/24 1257)  Resp: 19 (08/20/24 1256)  BP: 106/74 (08/20/24 1257)  SpO2: 98 % (08/20/24 1257)  Body mass index is 22.96 kg/m².  Weight: 72.6 kg (160 lb) Vital Signs (24h Range):  Temp:  [99.7 °F (37.6 °C)] 99.7 °F (37.6 °C)  Pulse:  [] 70  Resp:  [16-28] 19  SpO2:  [92 %-98 %] 98 %  BP: ()/(51-74) 106/74     Physical Exam  Vitals reviewed.   Constitutional:       General: He is not in acute distress.  HENT:      Mouth/Throat:      Mouth: Mucous membranes are moist.   Eyes:      Conjunctiva/sclera: Conjunctivae normal.      Pupils: Pupils are equal, round, and reactive to light.   Cardiovascular:      Rate and Rhythm: Normal rate and regular rhythm.      Pulses: Normal pulses.      Heart sounds: Normal heart sounds. No murmur heard.  Pulmonary:      Effort: No respiratory distress.      Breath sounds: Rhonchi (mkore prominent in the left) present. No wheezing or rales.      Comments: 4L NC in place  Abdominal:      General: There is no distension.      Palpations: Abdomen is soft.      Tenderness: There is no abdominal tenderness.   Musculoskeletal:         General: No tenderness.      Right lower leg: No edema.      Left lower leg: No edema.   Skin:     General: Skin is warm and dry.      Capillary Refill: Capillary refill takes less than 2 seconds.   Neurological:      General: No focal deficit present.      Mental Status: He is alert.           Lines/Drains/Airways       Drain  Duration             Male External Urinary Catheter 08/20/24 7972  "<1 day              Peripheral Intravenous Line  Duration                  Peripheral IV - Single Lumen 20 G Posterior;Right Hand -- days         Peripheral IV - Single Lumen 08/20/24 0827 20 G Left Forearm <1 day                    Significant Labs:    Lab Results   Component Value Date    WBC 9.07 08/20/2024    HGB 13.9 (L) 08/20/2024    HCT 41.1 (L) 08/20/2024    MCV 95.1 (H) 08/20/2024     08/20/2024           BMP  Lab Results   Component Value Date     08/20/2024    K 3.9 08/20/2024    CO2 19 (L) 08/20/2024    BUN 32.2 (H) 08/20/2024    CREATININE 1.71 (H) 08/20/2024    CALCIUM 8.7 (L) 08/20/2024    AGAP 11.0 08/20/2024    EGFRNONAA >60 03/04/2022         ABG  No results for input(s): "PH", "PO2", "PCO2", "HCO3", "POCBASEDEF" in the last 168 hours.    Mechanical Ventilation Support:         Significant Imaging:  Imaging Results              X-Ray Chest AP Portable (Final result)  Result time 08/20/24 09:07:37      Final result by Good Tyler MD (08/20/24 09:07:37)                   Impression:      Confluent opacities in the left lower lobe infiltrate can not be excluded.    Otherwise no active pulmonary disease      Electronically signed by: Good Tyler  Date:    08/20/2024  Time:    09:07               Narrative:    EXAMINATION:  XR CHEST AP PORTABLE    CLINICAL HISTORY:  Sepsis;, .    COMPARISON:  February 6, 2021    FINDINGS:  Examination reveals mediastinal cardiac silhouette to be within normal limits right lung field is clear and free of gross infiltrates atelectasis or effusions confluent opacities identified at the left base early infiltrate cannot be completely excluded.    No other focal consolidative changes                                       Assessment/Plan:     Assessment  Severe sepsis  Pneumonia  Hypoxia        Plan  Admit to ICU  Patient no longer altered, but continues to require 4L NC, no prior O2 use  Levophed started in the ED at 0.02 mcg, wean as tolerated to " have a MAP >65  SIRS 2/4 on admission, WBC: 9.07  HR: 105  RR: 26  T: 99.7  Initial Lactic acid 2.3, repeat was 2.3, trend until normalizes/downtrends  Received resuscitation fluids at 30 mL/kg within the first 3 hours  LR @ 125 cc/hr  Broad spectrum coverage with: Vanc, Zosyn  CXR: as above  Blood cx x2, Lactate, Resp cx, UA pending      DVT Prophylaxis: lovenox  GI Prophylaxis: pepcid     32 minutes of critical care was time spent personally by me on the following activities: development of treatment plan with patient or surrogate and bedside caregivers, discussions with consultants, evaluation of patient's response to treatment, examination of patient, ordering and performing treatments and interventions, ordering and review of laboratory studies, ordering and review of radiographic studies, pulse oximetry, re-evaluation of patient's condition.  This critical care time did not overlap with that of any other provider or involve time for any procedures.     Nevaeh Robertson DO  Pulmonary Critical Care Medicine  [facility] - Emergency Dept  DOS: 08/20/2024

## 2024-08-21 LAB
ALBUMIN SERPL-MCNC: 2.5 G/DL (ref 3.4–4.8)
ALBUMIN/GLOB SERPL: 0.9 RATIO (ref 1.1–2)
ALP SERPL-CCNC: 50 UNIT/L (ref 40–150)
ALT SERPL-CCNC: 11 UNIT/L (ref 0–55)
ANION GAP SERPL CALC-SCNC: 9 MEQ/L
AST SERPL-CCNC: 23 UNIT/L (ref 5–34)
BASOPHILS # BLD AUTO: 0.05 X10(3)/MCL
BASOPHILS NFR BLD AUTO: 0.7 %
BILIRUB SERPL-MCNC: 1 MG/DL
BUN SERPL-MCNC: 22.7 MG/DL (ref 8.4–25.7)
CALCIUM SERPL-MCNC: 7.9 MG/DL (ref 8.8–10)
CHLORIDE SERPL-SCNC: 110 MMOL/L (ref 98–107)
CO2 SERPL-SCNC: 20 MMOL/L (ref 23–31)
CREAT SERPL-MCNC: 0.89 MG/DL (ref 0.73–1.18)
CREAT/UREA NIT SERPL: 26
EOSINOPHIL # BLD AUTO: 0.15 X10(3)/MCL (ref 0–0.9)
EOSINOPHIL NFR BLD AUTO: 2.1 %
ERYTHROCYTE [DISTWIDTH] IN BLOOD BY AUTOMATED COUNT: 13.9 % (ref 11.5–17)
GFR SERPLBLD CREATININE-BSD FMLA CKD-EPI: >60 ML/MIN/1.73/M2
GLOBULIN SER-MCNC: 2.7 GM/DL (ref 2.4–3.5)
GLUCOSE SERPL-MCNC: 88 MG/DL (ref 82–115)
HCT VFR BLD AUTO: 34.4 % (ref 42–52)
HGB BLD-MCNC: 11.7 G/DL (ref 14–18)
IMM GRANULOCYTES # BLD AUTO: 0.02 X10(3)/MCL (ref 0–0.04)
IMM GRANULOCYTES NFR BLD AUTO: 0.3 %
LYMPHOCYTES # BLD AUTO: 1.19 X10(3)/MCL (ref 0.6–4.6)
LYMPHOCYTES NFR BLD AUTO: 16.8 %
MAGNESIUM SERPL-MCNC: 2.1 MG/DL (ref 1.6–2.6)
MCH RBC QN AUTO: 31.6 PG (ref 27–31)
MCHC RBC AUTO-ENTMCNC: 34 G/DL (ref 33–36)
MCV RBC AUTO: 93 FL (ref 80–94)
MONOCYTES # BLD AUTO: 0.6 X10(3)/MCL (ref 0.1–1.3)
MONOCYTES NFR BLD AUTO: 8.5 %
NEUTROPHILS # BLD AUTO: 5.09 X10(3)/MCL (ref 2.1–9.2)
NEUTROPHILS NFR BLD AUTO: 71.6 %
NRBC BLD AUTO-RTO: 0 %
PHOSPHATE SERPL-MCNC: 2.4 MG/DL (ref 2.3–4.7)
PLATELET # BLD AUTO: 178 X10(3)/MCL (ref 130–400)
PMV BLD AUTO: 10.3 FL (ref 7.4–10.4)
POTASSIUM SERPL-SCNC: 3.4 MMOL/L (ref 3.5–5.1)
PROT SERPL-MCNC: 5.2 GM/DL (ref 5.8–7.6)
RBC # BLD AUTO: 3.7 X10(6)/MCL (ref 4.7–6.1)
SODIUM SERPL-SCNC: 139 MMOL/L (ref 136–145)
VANCOMYCIN SERPL-MCNC: 11.9 UG/ML (ref 15–20)
WBC # BLD AUTO: 7.1 X10(3)/MCL (ref 4.5–11.5)

## 2024-08-21 PROCEDURE — 83735 ASSAY OF MAGNESIUM: CPT

## 2024-08-21 PROCEDURE — 85025 COMPLETE CBC W/AUTO DIFF WBC: CPT

## 2024-08-21 PROCEDURE — 25000003 PHARM REV CODE 250

## 2024-08-21 PROCEDURE — 80053 COMPREHEN METABOLIC PANEL: CPT

## 2024-08-21 PROCEDURE — 84100 ASSAY OF PHOSPHORUS: CPT

## 2024-08-21 PROCEDURE — 63600175 PHARM REV CODE 636 W HCPCS: Performed by: STUDENT IN AN ORGANIZED HEALTH CARE EDUCATION/TRAINING PROGRAM

## 2024-08-21 PROCEDURE — 25000003 PHARM REV CODE 250: Performed by: INTERNAL MEDICINE

## 2024-08-21 PROCEDURE — 80202 ASSAY OF VANCOMYCIN: CPT | Performed by: INTERNAL MEDICINE

## 2024-08-21 PROCEDURE — 63600175 PHARM REV CODE 636 W HCPCS

## 2024-08-21 PROCEDURE — 11000001 HC ACUTE MED/SURG PRIVATE ROOM

## 2024-08-21 PROCEDURE — 36415 COLL VENOUS BLD VENIPUNCTURE: CPT

## 2024-08-21 PROCEDURE — 63600175 PHARM REV CODE 636 W HCPCS: Performed by: INTERNAL MEDICINE

## 2024-08-21 PROCEDURE — 25000003 PHARM REV CODE 250: Performed by: STUDENT IN AN ORGANIZED HEALTH CARE EDUCATION/TRAINING PROGRAM

## 2024-08-21 RX ORDER — GABAPENTIN 100 MG/1
100 CAPSULE ORAL 3 TIMES DAILY
Status: DISCONTINUED | OUTPATIENT
Start: 2024-08-21 | End: 2024-08-23 | Stop reason: HOSPADM

## 2024-08-21 RX ORDER — TRAMADOL HYDROCHLORIDE 50 MG/1
50 TABLET ORAL EVERY 12 HOURS
COMMUNITY

## 2024-08-21 RX ORDER — ATORVASTATIN CALCIUM 40 MG/1
40 TABLET, FILM COATED ORAL DAILY
COMMUNITY

## 2024-08-21 RX ORDER — MAGNESIUM GLYCINATE 100 MG
100 CAPSULE ORAL ONCE
COMMUNITY

## 2024-08-21 RX ORDER — MIRTAZAPINE 15 MG/1
30 TABLET, FILM COATED ORAL NIGHTLY
Status: DISCONTINUED | OUTPATIENT
Start: 2024-08-21 | End: 2024-08-23 | Stop reason: HOSPADM

## 2024-08-21 RX ORDER — DIPHENHYDRAMINE HYDROCHLORIDE 50 MG/ML
25 INJECTION INTRAMUSCULAR; INTRAVENOUS EVERY 6 HOURS PRN
Status: DISCONTINUED | OUTPATIENT
Start: 2024-08-21 | End: 2024-08-23 | Stop reason: HOSPADM

## 2024-08-21 RX ORDER — GABAPENTIN 100 MG/1
100 CAPSULE ORAL 3 TIMES DAILY
COMMUNITY

## 2024-08-21 RX ORDER — CLOPIDOGREL BISULFATE 75 MG/1
75 TABLET ORAL DAILY
COMMUNITY

## 2024-08-21 RX ORDER — LORAZEPAM 1 MG/1
1 TABLET ORAL ONCE
Status: COMPLETED | OUTPATIENT
Start: 2024-08-21 | End: 2024-08-21

## 2024-08-21 RX ORDER — CLOPIDOGREL BISULFATE 75 MG/1
75 TABLET ORAL DAILY
Status: DISCONTINUED | OUTPATIENT
Start: 2024-08-22 | End: 2024-08-23 | Stop reason: HOSPADM

## 2024-08-21 RX ORDER — MELOXICAM 7.5 MG/1
7.5 TABLET ORAL DAILY
COMMUNITY

## 2024-08-21 RX ORDER — TAMSULOSIN HYDROCHLORIDE 0.4 MG/1
0.4 CAPSULE ORAL DAILY
COMMUNITY

## 2024-08-21 RX ORDER — TAMSULOSIN HYDROCHLORIDE 0.4 MG/1
0.4 CAPSULE ORAL DAILY
Status: DISCONTINUED | OUTPATIENT
Start: 2024-08-22 | End: 2024-08-23 | Stop reason: HOSPADM

## 2024-08-21 RX ORDER — ATORVASTATIN CALCIUM 40 MG/1
40 TABLET, FILM COATED ORAL DAILY
Status: DISCONTINUED | OUTPATIENT
Start: 2024-08-22 | End: 2024-08-23 | Stop reason: HOSPADM

## 2024-08-21 RX ORDER — MIRTAZAPINE 30 MG/1
30 TABLET, FILM COATED ORAL NIGHTLY
COMMUNITY

## 2024-08-21 RX ORDER — DEXTROMETHORPHAN HYDROBROMIDE, GUAIFENESIN 5; 100 MG/5ML; MG/5ML
650 LIQUID ORAL EVERY 8 HOURS
COMMUNITY

## 2024-08-21 RX ORDER — ACETAMINOPHEN 325 MG/1
650 TABLET ORAL EVERY 6 HOURS PRN
Status: DISCONTINUED | OUTPATIENT
Start: 2024-08-21 | End: 2024-08-23 | Stop reason: HOSPADM

## 2024-08-21 RX ORDER — QUETIAPINE 200 MG/1
150 TABLET, FILM COATED, EXTENDED RELEASE ORAL DAILY
COMMUNITY

## 2024-08-21 RX ORDER — METOPROLOL SUCCINATE 25 MG/1
25 TABLET, EXTENDED RELEASE ORAL DAILY
COMMUNITY

## 2024-08-21 RX ADMIN — Medication: at 02:08

## 2024-08-21 RX ADMIN — VANCOMYCIN HYDROCHLORIDE 750 MG: 750 INJECTION, POWDER, LYOPHILIZED, FOR SOLUTION INTRAVENOUS at 08:08

## 2024-08-21 RX ADMIN — FAMOTIDINE 20 MG: 20 TABLET, FILM COATED ORAL at 07:08

## 2024-08-21 RX ADMIN — MIRTAZAPINE 30 MG: 15 TABLET, FILM COATED ORAL at 08:08

## 2024-08-21 RX ADMIN — Medication: at 08:08

## 2024-08-21 RX ADMIN — PIPERACILLIN SODIUM AND TAZOBACTAM SODIUM 4.5 G: 4; .5 INJECTION, POWDER, LYOPHILIZED, FOR SOLUTION INTRAVENOUS at 04:08

## 2024-08-21 RX ADMIN — LORAZEPAM 1 MG: 1 TABLET ORAL at 08:08

## 2024-08-21 RX ADMIN — GABAPENTIN 100 MG: 100 CAPSULE ORAL at 08:08

## 2024-08-21 RX ADMIN — ENOXAPARIN SODIUM 40 MG: 40 INJECTION SUBCUTANEOUS at 04:08

## 2024-08-21 RX ADMIN — TRAZODONE HYDROCHLORIDE 25 MG: 50 TABLET ORAL at 08:08

## 2024-08-21 RX ADMIN — ACETAMINOPHEN 650 MG: 325 TABLET, FILM COATED ORAL at 08:08

## 2024-08-21 RX ADMIN — PIPERACILLIN SODIUM AND TAZOBACTAM SODIUM 4.5 G: 4; .5 INJECTION, POWDER, LYOPHILIZED, FOR SOLUTION INTRAVENOUS at 07:08

## 2024-08-21 RX ADMIN — DIPHENHYDRAMINE HYDROCHLORIDE 25 MG: 50 INJECTION INTRAMUSCULAR; INTRAVENOUS at 06:08

## 2024-08-21 RX ADMIN — MUPIROCIN: 20 OINTMENT TOPICAL at 07:08

## 2024-08-21 RX ADMIN — SODIUM CHLORIDE, POTASSIUM CHLORIDE, SODIUM LACTATE AND CALCIUM CHLORIDE: 600; 310; 30; 20 INJECTION, SOLUTION INTRAVENOUS at 05:08

## 2024-08-21 NOTE — PLAN OF CARE
Sent updates to patient's facility of residence. Made facility aware that there is no expected discharge date a this time.

## 2024-08-21 NOTE — H&P
[facility] Hospital Medicine History & Physical Examination       Patient Name: Richard Carter  MRN: [id number]  Patient Class: IP- Inpatient   Admission Date: 08/21/2024   Admitting Service: Hospital Medicine   Length of Stay: 1  Attending Physician: Dr. Peña  Primary Care Provider: Babs Primary Doctor  Face-to-Face encounter date: 08/21/2024  Code Status:  Full code  Chief Complaint: Fever (Pt to ER via [facility] for fever.  Pt resides at [facility].  Has been increasingly altered for 3 days.  Hx of UTI)      Patient information was obtained from patient, patient's family, past medical records and ER records.    HISTORY OF PRESENT ILLNESS:   Richard Carter is a 78 y.o. male with a PMHx of essential hypertension, hyperlipidemia, CVA with residual left-sided deficits, GERD, CAD, depression, insomnia, BPH and osteoarthritis who presented to [facility] via EMS from [facility] on 8/20/2024 with c/o altered mental status a fever x3 days.  Also endorsed a cough and shortness of breath.  EMS reported fever of 101.5 of the nursing home and hypoxia of 86% on room air upon their arrival.  He required supplemental oxygen via nasal cannula at 4 L.  Initial labs notable for hemoglobin 13.9, hematocrit 41.1, chloride 112, CO2 19, BUN 32.2, creatinine 1.71, glucose 130, calcium 8.7, albumin 3, lactic acidosis.  MRSA PCR detected.  Influenza and COVID-19 negative.  Urinalysis with 1+ protein, 1+ ketones, 2+blood, 11-20 hyaline casts.  Blood cultures X2 and urine culture pending.  CXR demonstrated confluent opacities in the left lower lobe infiltrate can not be excluded.  He became hypotensive in the ED and despite IV fluid resuscitation requiring vasopressors.  He was admitted to ICU.  He was also started on broad-spectrum IV antibiotics with vancomycin and Zosyn.  He has been weaned off of Levophed. Mentation has improved. He was cleared for downgrade to the floor on 08/21/2024.  [facility]  medicine consulted for assumption of care and further medical management.    REVIEW OF SYSTEMS:   Except as documented, all other systems reviewed and negative.    PAST MEDICAL HISTORY:   Essential hypertension   Hyperlipidemia   CVA with residual left-sided deficits  GERD   CAD   Depression   Insomnia   BPH   Osteoarthritis    PAST SURGICAL HISTORY:   Craniotomy   Cholecystectomy  Lumbar kyphoplasty    FAMILY HISTORY:   Reviewed and negative    SOCIAL HISTORY:   Denied alcohol, tobacco or illicit drug use.  Former smoker.  Lives at NH. Wheelchair bound at baseline.    SCREENING FOR SOCIAL DRIVERS FOR HEALTH:   Patient screened for food insecurity, housing instability, transportation needs, utility difficulties, and interpersonal safety (select all that apply as identified as concern):  []Housing or Food  []Transportation Needs  []Utility Difficulties  []Interpersonal safety  [x]None    ALLERGIES:   Patient has no known allergies.    HOME MEDICATIONS:     Prior to Admission medications    Not on File     ________________________________________________________________________  INPATIENT LIST OF MEDICATIONS     Current Facility-Administered Medications:     acetaminophen tablet 650 mg, 650 mg, Oral, Q6H PRN, Mica Menjivar MD, 650 mg at 08/21/24 0847    enoxaparin injection 40 mg, 40 mg, Subcutaneous, Daily, Marcelo, Megargel, DO, 40 mg at 08/20/24 1712    famotidine tablet 20 mg, 20 mg, Oral, Daily, Marcelo, Megargel, DO, 20 mg at 08/21/24 0757    lactated ringers infusion, , Intravenous, Continuous, Marcelo Megargel, DO, Last Rate: 125 mL/hr at 08/21/24 0522, New Bag at 08/21/24 0522    mupirocin 2 % ointment, , Nasal, BID, Lopez Gilliam Jr., MD, FCCP, Given at 08/21/24 0757    piperacillin-tazobactam (ZOSYN) 4.5 g in D5W 100 mL IVPB (MB+), 4.5 g, Intravenous, Q8H, Lopez Gilliam Jr., MD, FCCP, Last Rate: 25 mL/hr at 08/21/24 0759, 4.5 g at 08/21/24 0759    sodium chloride 0.9% flush 10 mL, 10 mL, Intravenous, PRN,  Nevaeh Robertson DO    trazodone split tablet 25 mg, 25 mg, Oral, Once, Mica Menjivar MD    Pharmacy to dose Vancomycin consult, , , Once **AND** vancomycin - pharmacy to dose, , Intravenous, pharmacy to manage frequency, Lopez Gilliam Jr., MD, [facility]    vancomycin 750 mg in dextrose 5 % 250 mL IVPB (ready to mix), 750 mg, Intravenous, Q12H, Lopez Gilliam Jr., MD, [facility], Stopped at 08/21/24 0956    Scheduled Meds:   enoxparin  40 mg Subcutaneous Daily    famotidine  20 mg Oral Daily    mupirocin   Nasal BID    piperacillin-tazobactam (Zosyn) IV (PEDS and ADULTS) (extended infusion is not appropriate)  4.5 g Intravenous Q8H    traZODone  25 mg Oral Once    vancomycin (VANCOCIN) IV (PEDS and ADULTS)  750 mg Intravenous Q12H     Continuous Infusions:   lactated ringers   Intravenous Continuous 125 mL/hr at 08/21/24 0522 New Bag at 08/21/24 0522     PRN Meds:.  Current Facility-Administered Medications:     acetaminophen, 650 mg, Oral, Q6H PRN    sodium chloride 0.9%, 10 mL, Intravenous, PRN    Pharmacy to dose Vancomycin consult, , , Once **AND** vancomycin - pharmacy to dose, , Intravenous, pharmacy to manage frequency    PHYSICAL EXAM:     VITAL SIGNS: 24 HRS MIN & MAX LAST   Temp  Min: 97.7 °F (36.5 °C)  Max: 99.2 °F (37.3 °C) 98.2 °F (36.8 °C)   BP  Min: 77/51  Max: 154/74 127/67   Pulse  Min: 60  Max: 88  88   Resp  Min: 16  Max: 32 (!) 23   SpO2  Min: 92 %  Max: 99 % (!) 93 %       General appearance: Well-developed male in no apparent distress.  HENT: Atraumatic head. Moist mucous membranes of oral cavity.  Eyes: Normal extraocular movements.   Neck: Supple.   Lungs: Clear to auscultation bilaterally.   Heart: Regular rate and rhythm. S1 and S2 present. No pedal edema.  Abdomen: Soft, non-distended, non-tender.  Extremities: No cyanosis, clubbing, or edema.  Skin: No Rash.   Neuro: Motor and sensory exams grossly intact.   Psych/mental status: Awake and alert. Appropriate mood and affect. Responds  appropriately to questions.     LABS AND IMAGING:     Recent Labs   Lab 08/20/24  0819 08/21/24  0420   WBC 9.07 7.10   RBC 4.32* 3.70*   HGB 13.9* 11.7*   HCT 41.1* 34.4*   MCV 95.1* 93.0   MCH 32.2* 31.6*   MCHC 33.8 34.0   RDW 14.1 13.9    178   MPV 9.9 10.3       Recent Labs   Lab 08/20/24  0839 08/21/24  0420    139   K 3.9 3.4*   * 110*   CO2 19* 20*   BUN 32.2* 22.7   CREATININE 1.71* 0.89   CALCIUM 8.7* 7.9*   MG  --  2.10   ALBUMIN 3.0* 2.5*   ALKPHOS 57 50   ALT 12 11   AST 26 23   BILITOT 0.7 1.0       Microbiology Results (last 7 days)       Procedure Component Value Units Date/Time    Blood Culture [[id number]]  (Normal) Collected: 08/20/24 0839    Order Status: Completed Specimen: Blood Updated: 08/21/24 0901     Blood Culture No Growth At 24 Hours    Blood Culture [[id number]]  (Normal) Collected: 08/20/24 0840    Order Status: Completed Specimen: Blood Updated: 08/21/24 0901     Blood Culture No Growth At 24 Hours    Urine Culture High Risk [[id number]] Collected: 08/20/24 2114    Order Status: Completed Specimen: Urine, Unspecified Updated: 08/21/24 0645     Urine Culture No Growth At 24 Hours    Respiratory Culture [[id number]]     Order Status: Sent Specimen: Sputum              X-Ray Chest AP Portable  Narrative: EXAMINATION:  XR CHEST AP PORTABLE    CLINICAL HISTORY:  Sepsis;, .    COMPARISON:  February 6, 2021    FINDINGS:  Examination reveals mediastinal cardiac silhouette to be within normal limits right lung field is clear and free of gross infiltrates atelectasis or effusions confluent opacities identified at the left base early infiltrate cannot be completely excluded.    No other focal consolidative changes  Impression: Confluent opacities in the left lower lobe infiltrate can not be excluded.    Otherwise no active pulmonary disease    Electronically signed by: Good Tyler  Date:    08/20/2024  Time:    09:07        ASSESSMENT:   Sepsis secondary to left lower  lobe pneumonia, community-acquired POA   - MRSA PCR detected  Acute hypoxemic respiratory failure   Acute hypotension-resolved   Hypokalemia   Hyperchloremic metabolic acidosis     History:  essential hypertension, hyperlipidemia, CVA with residual left-sided deficits, GERD, CAD, depression, insomnia, BPH and osteoarthritis      PLAN:   Continue broad-spectrum IV antibiotics with vancomycin and Zosyn  Follow blood and urine cultures   Sputum culture if able   Replete potassium   IV fluid resuscitation with LR at 125 ml/hr  Resume home medications as deemed appropriate once medication reconciliation is updated  Labs in AM    VTE Prophylaxis: Lovenox    Discharge Planning and Disposition: TBD    KIMBERLY, Ileana Sharp, NP have reviewed and discussed the case with Dr. Peña.  Please see the attending MD's addendum for further assessment and plan.    Ileana Sharp, [facility]  Department of Hospital Medicine   [facility]   08/21/2024    This note was created with the assistance of [employer] Voice Recognition Software. There may be transcription errors as a result of using this technology however minimal, and effort has been made to assure accuracy of transcription, but any obvious errors or omissions should be clarified with the author of the document.    _______________________________________________________________________________  MD Addendum:  Dr. KIMBERLY , assumed care of this patient today at --am/pm  For the patient encounter, I performed the substantive portion of the visit, I reviewed the NP/PA documentation, treatment plan, and medical decision making.  I had face to face time with this patient     A. History:    B. Physical exam:    C. Medical decision making:      All diagnosis and differential diagnosis have been reviewed; assessment and plan has been documented; I have personally reviewed the labs and test results that are presently available; I have reviewed the patients medication  list; I have reviewed the consulting providers response and recommendations. I have reviewed or attempted to review medical records based upon their availability.    All of the patient and family questions have been addressed and answered. Patient's is agreeable to the above stated plan. I will continue to monitor closely and make adjustments to medical management as needed.      08/21/2024

## 2024-08-21 NOTE — PLAN OF CARE
08/21/24 1020   Discharge Assessment   Assessment Type Discharge Planning Assessment   Confirmed/corrected address, phone number and insurance Yes   Confirmed Demographics Correct on Facesheet   Source of Information family   Communicated PELON with patient/caregiver Date not available/Unable to determine   Reason For Admission shortness of breath   [facility] facility resident   Facility Arrived From: [facility]   Do you expect to return to your current living situation? Yes   Do you have help at home or someone to help you manage your care at home? Yes   Who are your caregiver(s) and their phone number(s)? facility staff   Prior to hospitilization cognitive status: Unable to Assess   Current cognitive status: Unable to Assess   Walking or Climbing Stairs Difficulty yes   Walking or Climbing Stairs transferring difficulty, dependent   Mobility Management wheelchair   Dressing/Bathing Difficulty yes   Dressing/Bathing dressing difficulty, dependent   Dressing/Bathing Management facility staff   Home Accessibility wheelchair accessible   Equipment Currently Used at Home wheelchair;hospital bed   Patient currently being followed by outpatient case management? No   Do you currently have service(s) that help you manage your care at home? No   Do you take prescription medications? Yes   Who is going to help you get home at discharge? [facility] back to [facility]   How do you get to doctors appointments? health plan transportation   Are you on dialysis? No   Do you take coumadin? No   Discharge Plan A Return to nursing home   Discharge Plan B Return to Nursing Home   DME Needed Upon Discharge  none   Discharge Plan discussed with: Adult children   Transition of Care Barriers None   OTHER   Name(s) of People in Home facility resident     Patient is resident of [facility]. Notified COOPER Morales. Son, Colin is in room and provided information. He states he is POA and will provide paperwork. Patient is wheelchair  bound at nursing home and plan is to return.

## 2024-08-21 NOTE — NURSING
Nurses Note -- 4 Eyes      8/21/2024   2:26 AM      Skin assessed during: Q Shift Change      [] No Altered Skin Integrity Present    [x]Prevention Measures Documented      [x] Yes- Altered Skin Integrity Present or Discovered   [] LDA Added if Not in Epic (Describe Wound)   [] New Altered Skin Integrity was Present on Admit and Documented in LDA   [] Wound Image Taken    Wound Care Consulted? Yes    Attending Nurse:  JAISON Jameson     Second RN/Staff Member:  JAISON Padron

## 2024-08-21 NOTE — PROGRESS NOTES
Pulmonary & Critical Care Medicine   Progress Note      Presenting History/HPI:  Richard Carter is a 78 y.o. male with a pmh of CVA with residual left sided deficits, hearing impairment, and hypertension presents from [facility] with a 2-3 hx of AMS, shortness of breath, and fever. Patient's son at bedside and provides the majority of the history. Reports patient had been acting unlike himself. The nursing home staff noted hypoxemia at 86% and fever TMAX of 101.5.   Upon arrival, patient was hypotensive 118/56, tachycardic 105, tachypneic 26, and afebrile. Patient arrived on 4L NC sating at 95%. No history of COPD. WBC 9.07 PT 15.0, INR 1.2, BUN/Cr 32.2/1.71, lactate of 2.3,  UA was negative for infection. CXR with left sided infiltrates. He received the appropriate flud resuscitation at 30ml/kg, zosyn, and vancomycin. His map remained in the low 60s, so levophed was initiated. Admitted to the ICU for pressor support.      Interval History:  -no major issues or changes overnight   -patient was off all vasopressors and has been for some time after receiving adequate IV fluid resuscitation   -the patient has no complaints this morning   -hemoglobin 11.7, potassium 3.4   -patient denies any shortness a breath chest pain cough sputum production fevers chills sweats      Scheduled Medications:    enoxparin  40 mg Subcutaneous Daily    famotidine  20 mg Oral Daily    mupirocin   Nasal BID    piperacillin-tazobactam (Zosyn) IV (PEDS and ADULTS) (extended infusion is not appropriate)  4.5 g Intravenous Q8H    traZODone  25 mg Oral Once    vancomycin (VANCOCIN) IV (PEDS and ADULTS)  750 mg Intravenous Q12H       PRN Medications:     Current Facility-Administered Medications:     acetaminophen, 650 mg, Oral, Q6H PRN    sodium chloride 0.9%, 10 mL, Intravenous, PRN    Pharmacy to dose Vancomycin consult, , , Once **AND** vancomycin - pharmacy to dose, , Intravenous, pharmacy to manage  frequency      Infusions:     lactated ringers   Intravenous Continuous 125 mL/hr at 08/21/24 0522 New Bag at 08/21/24 0522         Fluid Balance:     Intake/Output Summary (Last 24 hours) at 8/21/2024 1114  Last data filed at 8/21/2024 0522  Gross per 24 hour   Intake 1184.04 ml   Output 350 ml   Net 834.04 ml         Vital Signs:   Vitals:    08/21/24 0600   BP: 127/67   Pulse: 88   Resp: (!) 23   Temp:          Physical Exam  Vitals and nursing note reviewed.   Constitutional:       General: He is not in acute distress.     Appearance: Normal appearance. He is not ill-appearing or toxic-appearing.   HENT:      Head: Normocephalic and atraumatic.      Right Ear: External ear normal.      Left Ear: External ear normal.      Nose: Nose normal.      Mouth/Throat:      Mouth: Mucous membranes are moist.      Pharynx: Oropharynx is clear. No oropharyngeal exudate or posterior oropharyngeal erythema.   Eyes:      General: No scleral icterus.     Extraocular Movements: Extraocular movements intact.      Conjunctiva/sclera: Conjunctivae normal.      Pupils: Pupils are equal, round, and reactive to light.   Neck:      Vascular: No carotid bruit.   Cardiovascular:      Rate and Rhythm: Normal rate and regular rhythm.      Pulses: Normal pulses.      Heart sounds: Normal heart sounds. No murmur heard.     No friction rub. No gallop.   Pulmonary:      Effort: Pulmonary effort is normal. No respiratory distress.      Breath sounds: Normal breath sounds. No wheezing, rhonchi or rales.   Abdominal:      General: Abdomen is flat. Bowel sounds are normal. There is no distension.      Palpations: Abdomen is soft.      Tenderness: There is no abdominal tenderness. There is no guarding or rebound.   Genitourinary:     Comments: deferred  Musculoskeletal:         General: No swelling or deformity. Normal range of motion.      Cervical back: Normal range of motion and neck supple. No rigidity or tenderness.   Lymphadenopathy:       "Cervical: No cervical adenopathy.   Skin:     General: Skin is warm and dry.      Capillary Refill: Capillary refill takes less than 2 seconds.      Coloration: Skin is not jaundiced.      Findings: No bruising, lesion or rash.   Neurological:      General: No focal deficit present.      Mental Status: He is alert and oriented to person, place, and time.      Cranial Nerves: No cranial nerve deficit.      Sensory: No sensory deficit.      Motor: No weakness.   Psychiatric:         Mood and Affect: Mood normal.           Ventilator Settings         Laboratory Studies:   No results for input(s): "PH", "PCO2", "PO2", "HCO3", "POCSATURATED", "BE" in the last 24 hours.  Recent Labs   Lab 08/21/24  0420   WBC 7.10   RBC 3.70*   HGB 11.7*   HCT 34.4*      MCV 93.0   MCH 31.6*   MCHC 34.0     Recent Labs   Lab 08/21/24 0420   GLUCOSE 88      K 3.4*   *   CO2 20*   BUN 22.7   CREATININE 0.89   CALCIUM 7.9*   MG 2.10         Microbiology Data:   Microbiology Results (last 7 days)       Procedure Component Value Units Date/Time    Blood Culture [[id number]]  (Normal) Collected: 08/20/24 0839    Order Status: Completed Specimen: Blood Updated: 08/21/24 0901     Blood Culture No Growth At 24 Hours    Blood Culture [[id number]]  (Normal) Collected: 08/20/24 0840    Order Status: Completed Specimen: Blood Updated: 08/21/24 0901     Blood Culture No Growth At 24 Hours    Urine Culture High Risk [[id number]] Collected: 08/20/24 2114    Order Status: Completed Specimen: Urine, Unspecified Updated: 08/21/24 0645     Urine Culture No Growth At 24 Hours    Respiratory Culture [[id number]]     Order Status: Sent Specimen: Sputum               Imaging:   X-Ray Chest AP Portable  Narrative: EXAMINATION:  XR CHEST AP PORTABLE    CLINICAL HISTORY:  Sepsis;, .    COMPARISON:  February 6, 2021    FINDINGS:  Examination reveals mediastinal cardiac silhouette to be within normal limits right lung field is clear and free of " gross infiltrates atelectasis or effusions confluent opacities identified at the left base early infiltrate cannot be completely excluded.    No other focal consolidative changes  Impression: Confluent opacities in the left lower lobe infiltrate can not be excluded.    Otherwise no active pulmonary disease    Electronically signed by: Good Tyler  Date:    08/20/2024  Time:    09:07          Assessment and Plan    Assessment:  Sepsis  Hypovolemia   Community-acquired pneumonia   Acute hypoxemic respiratory failure    Plan:  -continue broad-spectrum antimicrobial coverage for community-acquired pneumonia  -supplement oxygen if necessary to maintain saturation 94-96%  -the patient received adequate IV fluid resuscitation and vasopressors quickly discontinued, currently off pressors and hemodynamically stable    Transfer out of ICU level care    DVT prophylaxis with Lovenox    Jasmeet Rothman MD  8/21/2024  Pulmonology/Critical Care

## 2024-08-21 NOTE — PROGRESS NOTES
[facility] - 7th Floor ICU  Wound Care    Patient Name:  Richard Carter   MRN:  [id number]  Date: 8/21/2024  Diagnosis: <principal problem not specified>    History:     Past Medical History:   Diagnosis Date    CVA (cerebral vascular accident)     HTN (hypertension)        Social History     Socioeconomic History    Marital status:        Precautions:     Allergies as of 08/20/2024    (No Known Allergies)       WO Assessment Details/Treatment        08/21/24 1132   WOCN Assessment   Visit Date 08/21/24   Visit Time 1132   Consult Type New   WO Speciality Wound   Intervention chart review;assessed;applied;orders   Teaching on-going        Wound 08/20/24 Pressure Injury Coccyx   Date First Assessed: 08/20/24   Present on Original Admission: Yes  Primary Wound Type: Pressure Injury  Location: Coccyx   Wound Image    Pressure Injury Stage 2   Dressing Appearance Intact;Dry   Drainage Amount None   Drainage Characteristics/Odor No odor   Appearance Intact;Pink;Maroon;Red   Tissue loss description Not applicable   Black (%), Wound Tissue Color 0 %   Red (%), Wound Tissue Color 100 %   Yellow (%), Wound Tissue Color 0 %   Periwound Area Moist;Redness   Care Cleansed with:;Soap and water   Dressing Applied;Other (comment);Absorptive Pad  (zinc oxide/desitin,)     WOCN consulted for sacrum. Discussed POC w/ nurse You. Family at bedside. Explained reason for visit. Treatment recommendations: sacrum: clean w/ soap/water, dry well, apply zinc oxide/desitin, abd pad, tape. BID/Prn if soiled. Educated pt. On the importance of offloading q2hr w/ purple wedge that is in pt.'s room and keeping areas clean and dry. Nursing to cont. Tx recs. And preventative measures. Pt. On icu DAYSI. Will follow up.     08/21/2024

## 2024-08-22 LAB
ALBUMIN SERPL-MCNC: 2.9 G/DL (ref 3.4–4.8)
ALBUMIN/GLOB SERPL: 0.9 RATIO (ref 1.1–2)
ALP SERPL-CCNC: 58 UNIT/L (ref 40–150)
ALT SERPL-CCNC: 13 UNIT/L (ref 0–55)
ANION GAP SERPL CALC-SCNC: 14 MEQ/L
AST SERPL-CCNC: 23 UNIT/L (ref 5–34)
BACTERIA UR CULT: NO GROWTH
BASOPHILS # BLD AUTO: 0.08 X10(3)/MCL
BASOPHILS NFR BLD AUTO: 0.9 %
BILIRUB SERPL-MCNC: 1 MG/DL
BUN SERPL-MCNC: 8.8 MG/DL (ref 8.4–25.7)
CALCIUM SERPL-MCNC: 8.6 MG/DL (ref 8.8–10)
CHLORIDE SERPL-SCNC: 108 MMOL/L (ref 98–107)
CO2 SERPL-SCNC: 20 MMOL/L (ref 23–31)
CREAT SERPL-MCNC: 0.71 MG/DL (ref 0.73–1.18)
CREAT/UREA NIT SERPL: 12
EOSINOPHIL # BLD AUTO: 0.27 X10(3)/MCL (ref 0–0.9)
EOSINOPHIL NFR BLD AUTO: 3.1 %
ERYTHROCYTE [DISTWIDTH] IN BLOOD BY AUTOMATED COUNT: 13.5 % (ref 11.5–17)
GFR SERPLBLD CREATININE-BSD FMLA CKD-EPI: >60 ML/MIN/1.73/M2
GLOBULIN SER-MCNC: 3.3 GM/DL (ref 2.4–3.5)
GLUCOSE SERPL-MCNC: 85 MG/DL (ref 82–115)
HCT VFR BLD AUTO: 41.1 % (ref 42–52)
HGB BLD-MCNC: 13.9 G/DL (ref 14–18)
IMM GRANULOCYTES # BLD AUTO: 0.1 X10(3)/MCL (ref 0–0.04)
IMM GRANULOCYTES NFR BLD AUTO: 1.1 %
LYMPHOCYTES # BLD AUTO: 1.38 X10(3)/MCL (ref 0.6–4.6)
LYMPHOCYTES NFR BLD AUTO: 15.9 %
MCH RBC QN AUTO: 31.7 PG (ref 27–31)
MCHC RBC AUTO-ENTMCNC: 33.8 G/DL (ref 33–36)
MCV RBC AUTO: 93.6 FL (ref 80–94)
MONOCYTES # BLD AUTO: 0.74 X10(3)/MCL (ref 0.1–1.3)
MONOCYTES NFR BLD AUTO: 8.5 %
NEUTROPHILS # BLD AUTO: 6.13 X10(3)/MCL (ref 2.1–9.2)
NEUTROPHILS NFR BLD AUTO: 70.5 %
NRBC BLD AUTO-RTO: 0 %
PLATELET # BLD AUTO: 212 X10(3)/MCL (ref 130–400)
PMV BLD AUTO: 10.4 FL (ref 7.4–10.4)
POTASSIUM SERPL-SCNC: 3.4 MMOL/L (ref 3.5–5.1)
PROT SERPL-MCNC: 6.2 GM/DL (ref 5.8–7.6)
RBC # BLD AUTO: 4.39 X10(6)/MCL (ref 4.7–6.1)
SODIUM SERPL-SCNC: 142 MMOL/L (ref 136–145)
VANCOMYCIN TROUGH SERPL-MCNC: 17.2 UG/ML (ref 15–20)
VANCOMYCIN TROUGH SERPL-MCNC: 25.1 UG/ML (ref 15–20)
WBC # BLD AUTO: 8.7 X10(3)/MCL (ref 4.5–11.5)

## 2024-08-22 PROCEDURE — 63600175 PHARM REV CODE 636 W HCPCS: Performed by: NURSE PRACTITIONER

## 2024-08-22 PROCEDURE — 80202 ASSAY OF VANCOMYCIN: CPT | Performed by: INTERNAL MEDICINE

## 2024-08-22 PROCEDURE — 27000221 HC OXYGEN, UP TO 24 HOURS

## 2024-08-22 PROCEDURE — 25000003 PHARM REV CODE 250

## 2024-08-22 PROCEDURE — 11000001 HC ACUTE MED/SURG PRIVATE ROOM

## 2024-08-22 PROCEDURE — 80202 ASSAY OF VANCOMYCIN: CPT | Performed by: STUDENT IN AN ORGANIZED HEALTH CARE EDUCATION/TRAINING PROGRAM

## 2024-08-22 PROCEDURE — 25000003 PHARM REV CODE 250: Performed by: STUDENT IN AN ORGANIZED HEALTH CARE EDUCATION/TRAINING PROGRAM

## 2024-08-22 PROCEDURE — 63600175 PHARM REV CODE 636 W HCPCS: Performed by: INTERNAL MEDICINE

## 2024-08-22 PROCEDURE — 85025 COMPLETE CBC W/AUTO DIFF WBC: CPT | Performed by: NURSE PRACTITIONER

## 2024-08-22 PROCEDURE — 25000003 PHARM REV CODE 250: Performed by: INTERNAL MEDICINE

## 2024-08-22 PROCEDURE — 36415 COLL VENOUS BLD VENIPUNCTURE: CPT | Performed by: NURSE PRACTITIONER

## 2024-08-22 PROCEDURE — 97162 PT EVAL MOD COMPLEX 30 MIN: CPT

## 2024-08-22 PROCEDURE — 36415 COLL VENOUS BLD VENIPUNCTURE: CPT | Performed by: INTERNAL MEDICINE

## 2024-08-22 PROCEDURE — 80053 COMPREHEN METABOLIC PANEL: CPT | Performed by: NURSE PRACTITIONER

## 2024-08-22 PROCEDURE — 97165 OT EVAL LOW COMPLEX 30 MIN: CPT

## 2024-08-22 PROCEDURE — 63600175 PHARM REV CODE 636 W HCPCS

## 2024-08-22 RX ORDER — QUETIAPINE FUMARATE 50 MG/1
100 TABLET, EXTENDED RELEASE ORAL NIGHTLY
Status: DISCONTINUED | OUTPATIENT
Start: 2024-08-22 | End: 2024-08-23 | Stop reason: HOSPADM

## 2024-08-22 RX ORDER — HYDRALAZINE HYDROCHLORIDE 20 MG/ML
10 INJECTION INTRAMUSCULAR; INTRAVENOUS EVERY 4 HOURS PRN
Status: DISCONTINUED | OUTPATIENT
Start: 2024-08-22 | End: 2024-08-23 | Stop reason: HOSPADM

## 2024-08-22 RX ORDER — ONDANSETRON HYDROCHLORIDE 2 MG/ML
4 INJECTION, SOLUTION INTRAVENOUS EVERY 6 HOURS PRN
Status: DISCONTINUED | OUTPATIENT
Start: 2024-08-22 | End: 2024-08-23 | Stop reason: HOSPADM

## 2024-08-22 RX ADMIN — PIPERACILLIN SODIUM AND TAZOBACTAM SODIUM 4.5 G: 4; .5 INJECTION, POWDER, LYOPHILIZED, FOR SOLUTION INTRAVENOUS at 11:08

## 2024-08-22 RX ADMIN — QUETIAPINE FUMARATE 100 MG: 50 TABLET, EXTENDED RELEASE ORAL at 08:08

## 2024-08-22 RX ADMIN — CLOPIDOGREL BISULFATE 75 MG: 75 TABLET ORAL at 08:08

## 2024-08-22 RX ADMIN — VANCOMYCIN HYDROCHLORIDE 750 MG: 750 INJECTION, POWDER, LYOPHILIZED, FOR SOLUTION INTRAVENOUS at 08:08

## 2024-08-22 RX ADMIN — MIRTAZAPINE 30 MG: 15 TABLET, FILM COATED ORAL at 08:08

## 2024-08-22 RX ADMIN — Medication: at 09:08

## 2024-08-22 RX ADMIN — ENOXAPARIN SODIUM 40 MG: 40 INJECTION SUBCUTANEOUS at 04:08

## 2024-08-22 RX ADMIN — MUPIROCIN: 20 OINTMENT TOPICAL at 08:08

## 2024-08-22 RX ADMIN — GABAPENTIN 100 MG: 100 CAPSULE ORAL at 04:08

## 2024-08-22 RX ADMIN — FAMOTIDINE 20 MG: 20 TABLET, FILM COATED ORAL at 08:08

## 2024-08-22 RX ADMIN — GABAPENTIN 100 MG: 100 CAPSULE ORAL at 08:08

## 2024-08-22 RX ADMIN — Medication: at 08:08

## 2024-08-22 RX ADMIN — HYDRALAZINE HYDROCHLORIDE 10 MG: 20 INJECTION INTRAMUSCULAR; INTRAVENOUS at 12:08

## 2024-08-22 RX ADMIN — ATORVASTATIN CALCIUM 40 MG: 40 TABLET, FILM COATED ORAL at 08:08

## 2024-08-22 RX ADMIN — PIPERACILLIN SODIUM AND TAZOBACTAM SODIUM 4.5 G: 4; .5 INJECTION, POWDER, LYOPHILIZED, FOR SOLUTION INTRAVENOUS at 08:08

## 2024-08-22 RX ADMIN — PIPERACILLIN SODIUM AND TAZOBACTAM SODIUM 4.5 G: 4; .5 INJECTION, POWDER, LYOPHILIZED, FOR SOLUTION INTRAVENOUS at 04:08

## 2024-08-22 RX ADMIN — TAMSULOSIN HYDROCHLORIDE 0.4 MG: 0.4 CAPSULE ORAL at 08:08

## 2024-08-22 RX ADMIN — PIPERACILLIN SODIUM AND TAZOBACTAM SODIUM 4.5 G: 4; .5 INJECTION, POWDER, LYOPHILIZED, FOR SOLUTION INTRAVENOUS at 12:08

## 2024-08-22 NOTE — CONSULTS
"Inpatient Nutrition Assessment    Admit Date: 8/20/2024   Total duration of encounter: 2 days   Patient Age: 78 y.o.    Nutrition Recommendation/Prescription     Continue diet (Diet Heart Healthy) as tolerated.   Boost Plus (provides 360 kcal, 14 g protein per serving) TID.    Communication of Recommendations: reviewed with nurse, reviewed with patient, and reviewed with family    Nutrition Assessment     Malnutrition Assessment/Nutrition-Focused Physical Exam    Malnutrition Context: chronic illness (08/22/24 1134)  Malnutrition Level: severe (08/22/24 1134)  Energy Intake (Malnutrition): less than 75% for greater than or equal to 1 month (08/22/24 1134)  Weight Loss (Malnutrition):  (none reported) (08/22/24 1134)  Subcutaneous Fat (Malnutrition): severe depletion (08/22/24 1134)  Orbital Region (Subcutaneous Fat Loss): severe depletion  Upper Arm Region (Subcutaneous Fat Loss): mild depletion     Muscle Mass (Malnutrition): mild depletion (08/22/24 1134)  [place] Region (Muscle Loss): mild depletion  Clavicle Bone Region (Muscle Loss): mild depletion                             A minimum of two characteristics is recommended for diagnosis of either severe or non-severe malnutrition.    Chart Review    Reason Seen: physician consult for "no appetite at all today"    Malnutrition Screening Tool Results   Have you recently lost weight without trying?: No  Have you been eating poorly because of a decreased appetite?: No   MST Score: 0   Diagnosis:  Sepsis secondary to left lower lobe community - acquired pneumonia  Acute hypoxemic respiratory failure due to above-improving   MRSA PCR positive   Borderline hypotension - resolving   Mild hypokalemia     Relevant Medical History: essential hypertension, hyperlipidemia, CVA with residual left-sided deficits, GERD, CAD, depression, insomnia, BPH, osteoarthritis     Scheduled Medications:  atorvastatin, 40 mg, Daily  clopidogreL, 75 mg, Daily  enoxparin, 40 mg, " Daily  famotidine, 20 mg, Daily  gabapentin, 100 mg, TID  mirtazapine, 30 mg, QHS  mupirocin, , BID  piperacillin-tazobactam (Zosyn) IV (PEDS and ADULTS) (extended infusion is not appropriate), 4.5 g, Q8H  tamsulosin, 0.4 mg, Daily  vancomycin (VANCOCIN) IV (PEDS and ADULTS), 750 mg, Q12H  zinc oxide-cod liver oil, , BID    Continuous Infusions: none       PRN Medications:   acetaminophen, 650 mg, Q6H PRN  diphenhydrAMINE, 25 mg, Q6H PRN  hydrALAZINE, 10 mg, Q4H PRN  sodium chloride 0.9%, 10 mL, PRN  vancomycin - pharmacy to dose, , pharmacy to manage frequency    Calorie Containing IV Medications: no significant kcals from medications at this time    Recent Labs   Lab 08/20/24  0819 08/20/24  0839 08/21/24  0420 08/22/24  0352   NA  --  142 139 142   K  --  3.9 3.4* 3.4*   CALCIUM  --  8.7* 7.9* 8.6*   PHOS  --   --  2.4  --    MG  --   --  2.10  --    CL  --  112* 110* 108*   CO2  --  19* 20* 20*   BUN  --  32.2* 22.7 8.8   CREATININE  --  1.71* 0.89 0.71*   EGFRNORACEVR  --  41 >60 >60   GLUCOSE  --  130* 88 85   BILITOT  --  0.7 1.0 1.0   ALKPHOS  --  57 50 58   ALT  --  12 11 13   AST  --  26 23 23   ALBUMIN  --  3.0* 2.5* 2.9*   WBC 9.07  --  7.10 8.70   HGB 13.9*  --  11.7* 13.9*   HCT 41.1*  --  34.4* 41.1*     Nutrition Orders:  Diet Heart Healthy    Appetite/Oral Intake: poor/25-50% of meals  Factors Affecting Nutritional Intake: decreased appetite  Social Needs Impacting Access to Food: none identified  Food/Quaker/Cultural Preferences: none reported  Food Allergies: none reported and no known food allergies  Last Bowel Movement: 08/22/24  Wound(s):     Wound 08/20/24 Pressure Injury Coccyx-Tissue loss description: Not applicable    Comments    8/22/24 Spoke with patient and son at bedside. They report chronic poor appetite, eating about 50% of meals at nursing home typically, drinks Ensure with every meal, appetite decreased from usual over the past 3 days, improving. They report weight has been  "stable. Patient agreeable to chocolate Boost Plus. Denies difficulty chewing/swallowing.    Anthropometrics    Height: 5' 10" (177.8 cm),    Last Weight: 72.6 kg (160 lb) (08/20/24 0801),    BMI (Calculated): 23  BMI Classification: normal (BMI 18.5-24.9)        Ideal Body Weight (IBW), Male: 166 lb     % Ideal Body Weight, Male (lb): 96.39 %                          Usual Weight Provided By: patient denies unintentional weight loss and family/caregiver denies unintentional weight loss    Wt Readings from Last 5 Encounters:   08/20/24 72.6 kg (160 lb)   02/12/20 78 kg (171 lb 15.7 oz)     Weight Change(s) Since Admission: none, new admit, unable to obtain bed weight during rounds due to scale not working  Wt Readings from Last 1 Encounters:   08/20/24 0801 72.6 kg (160 lb)   Admit Weight: 72.6 kg (160 lb) (08/20/24 0801),      Estimated Needs    Weight Used For Calorie Calculations: 72.6 kg (160 lb 0.9 oz)  Energy Calorie Requirements (kcal): 6844-0231, 1.2-1.4 stress factor  Energy Need Method: [place]-St Jeor  Weight Used For Protein Calculations: 72.6 kg (160 lb 0.9 oz)  Protein Requirements:  g, 1.3-1.5 g/kg  Fluid Requirements (mL): 4857-8355, 1 ml/kcal      Enteral Nutrition Patient not receiving enteral nutrition at this time.    Parenteral Nutrition Patient not receiving parenteral nutrition support at this time.    Evaluation of Received Nutrient Intake    Calories: not meeting estimated needs  Protein: not meeting estimated needs    Patient Education Not applicable.    Nutrition Diagnosis     PES: Inadequate energy intake related to inability to consume sufficient nutrients as evidenced by less than 80% needs met. (new)  PES: Severe chronic disease or condition related malnutrition related to suboptimal protein/energy intake as evidenced by less than 75% needs met for greater than or equal to 1 month, severe fat depletion, and mild muscle depletion. (new)    Nutrition Interventions "     Intervention(s): general/healthful diet, commercial beverage, and collaboration with other providers  Goal: Meet greater than 80% of nutritional needs by follow-up. (new)    Nutrition Goals & Monitoring     Dietitian will monitor: food and beverage intake, weight, and electrolyte/renal panel  Discharge planning: resume home regimen  Nutrition Risk/Follow-Up: moderate (follow-up in 3-5 days)   Please consult if re-assessment needed sooner.

## 2024-08-22 NOTE — PLAN OF CARE
Problem: Adult Inpatient Plan of Care  Goal: Plan of Care Review  Outcome: Progressing  Goal: Patient-Specific Goal (Individualized)  Outcome: Progressing  Goal: Absence of Hospital-Acquired Illness or Injury  Outcome: Progressing  Goal: Optimal Comfort and Wellbeing  Outcome: Progressing  Goal: Readiness for Transition of Care  Outcome: Progressing     Problem: Skin Injury Risk Increased  Goal: Skin Health and Integrity  Outcome: Progressing     Problem: Wound  Goal: Optimal Coping  Intervention: Support Patient and Family Response  Flowsheets (Taken 8/22/2024 1424)  Supportive Measures: active listening utilized  Family/Support System Care:   involvement promoted   support provided  Goal: Optimal Functional Ability  Intervention: Optimize Functional Ability  Flowsheets (Taken 8/22/2024 1424)  Activity Assistance Provided: assistance, 1 person  Goal: Absence of Infection Signs and Symptoms  Intervention: Prevent or Manage Infection  Flowsheets (Taken 8/22/2024 1424)  Infection Management: aseptic technique maintained  Goal: Improved Oral Intake  Intervention: Promote and Optimize Oral Intake  Flowsheets (Taken 8/22/2024 1424)  Nutrition Interventions:   meal set-up provided   supplemental drinks provided  Goal: Skin Health and Integrity  Intervention: Optimize Skin Protection  Flowsheets (Taken 8/22/2024 1424)  Skin Protection:   incontinence pads utilized   pulse oximeter probe site changed   silicone foam dressing in place   skin sealant/moisture barrier applied

## 2024-08-22 NOTE — PROGRESS NOTES
[facility] Hospital Medicine Progress Note        Chief Complaint: Inpatient Follow-up for     HPI:   Richard Carter is a 78 y.o. male with a PMHx of essential hypertension, hyperlipidemia, CVA with residual left-sided deficits, GERD, CAD, depression, insomnia, BPH and osteoarthritis who presented to [facility] via EMS from [facility] on 8/20/2024 with c/o altered mental status a fever x3 days.  Also endorsed a cough and shortness of breath.  EMS reported fever of 101.5 of the nursing home and hypoxia of 86% on room air upon their arrival.  He required supplemental oxygen via nasal cannula at 4 L.  Initial labs notable for hemoglobin 13.9, hematocrit 41.1, chloride 112, CO2 19, BUN 32.2, creatinine 1.71, glucose 130, calcium 8.7, albumin 3, lactic acidosis.  MRSA PCR detected.  Influenza and COVID-19 negative.  Urinalysis with 1+ protein, 1+ ketones, 2+blood, 11-20 hyaline casts.  Blood cultures X2 and urine culture pending.  CXR demonstrated confluent opacities in the left lower lobe infiltrate can not be excluded.  He became hypotensive in the ED and despite IV fluid resuscitation requiring vasopressors.  He was admitted to ICU.  He was also started on broad-spectrum IV antibiotics with vancomycin and Zosyn.  He has been weaned off of Levophed. Mentation has improved. He was cleared for downgrade to the floor on 08/21/2024.  Hospital medicine consulted for assumption of care and further medical management.       Interval Hx:   Alert, doing well on room air.  Answers all questions appropriately.  Blood pressure mildly elevated.  Reports generalized weakness and lethargy otherwise nothing newly remarkable.  At baseline he is independent with ADLs and IADLs.  No family members at bedside this morning.  Labs showing stable hemoglobin platelets, mild hypokalemia, mild acidosis as well.  Chronic low albumin noted.  MRSA PCR was positive.  Blood cultures negative thus far.  So is  urine      Objective/physical exam:  Vitals:    08/22/24 0000 08/22/24 0300 08/22/24 0400 08/22/24 0600   BP: (!) 166/87  Comment: see MAR  123/75 (!) 153/83   BP Location: Right arm  Right arm    Patient Position: Lying  Lying    Pulse: 100  (!) 113 106   Resp: (!) 26  18 15   Temp: 97.8 °F (36.6 °C)  97.7 °F (36.5 °C)    TempSrc: Oral  Oral    SpO2: 97% 98% 98% 98%   Weight:       Height:         General: In no acute distress, afebrile  Respiratory: Clear to auscultation bilaterally  Cardiovascular: S1, S2, no appreciable murmur  Abdomen: Soft, nontender, BS +  MSK: Warm, no lower extremity edema, no clubbing or cyanosis  Neurologic: Alert and oriented x4, moving all extremities with good strength     Lab Results   Component Value Date     08/22/2024    K 3.4 (L) 08/22/2024     (H) 08/22/2024    CO2 20 (L) 08/22/2024    BUN 8.8 08/22/2024    CREATININE 0.71 (L) 08/22/2024    CALCIUM 8.6 (L) 08/22/2024    EGFRNONAA >60 03/04/2022      Lab Results   Component Value Date    ALT 13 08/22/2024    AST 23 08/22/2024    ALKPHOS 58 08/22/2024    BILITOT 1.0 08/22/2024      Lab Results   Component Value Date    WBC 8.70 08/22/2024    HGB 13.9 (L) 08/22/2024    HCT 41.1 (L) 08/22/2024    MCV 93.6 08/22/2024     08/22/2024           Medications:   atorvastatin  40 mg Oral Daily    clopidogreL  75 mg Oral Daily    enoxparin  40 mg Subcutaneous Daily    famotidine  20 mg Oral Daily    gabapentin  100 mg Oral TID    mirtazapine  30 mg Oral QHS    mupirocin   Nasal BID    piperacillin-tazobactam (Zosyn) IV (PEDS and ADULTS) (extended infusion is not appropriate)  4.5 g Intravenous Q8H    tamsulosin  0.4 mg Oral Daily    vancomycin (VANCOCIN) IV (PEDS and ADULTS)  750 mg Intravenous Q12H    zinc oxide-cod liver oil   Topical (Top) BID        Current Facility-Administered Medications:     acetaminophen, 650 mg, Oral, Q6H PRN    diphenhydrAMINE, 25 mg, Intravenous, Q6H PRN    hydrALAZINE, 10 mg, Intravenous, Q4H  PRN    sodium chloride 0.9%, 10 mL, Intravenous, PRN    Pharmacy to dose Vancomycin consult, , , Once **AND** vancomycin - pharmacy to dose, , Intravenous, pharmacy to manage frequency     Assessment/Plan:    Sepsis secondary to left lower lobe community-acquired pneumonia-POA   Acute hypoxemic respiratory failure due to above-improving   MRSA PCR positive   Borderline hypotension-resolving   Mild hypokalemia       HX:  essential hypertension, hyperlipidemia, CVA with residual left-sided deficits, GERD, CAD, depression, insomnia, BPH and osteoarthritis     Plan:   -stable from hemodynamic and respiratory standpoint.  We will follow cultures until finalized.  For now keep vancomycin and Zosyn in setting of positive MRSA PCR and sepsis at presentation.    -replete potassium   -DC IV fluids.  Encourage ambulation, p.o. intake   -other medications  reviewed and renewed       Lovenox      Yong Alex MD

## 2024-08-22 NOTE — PT/OT/SLP EVAL
"Occupational Therapy   Evaluation and Discharge Note    Name: Richard Carter  MRN: [id number]  Admitting Diagnosis: sepsis  Recent Surgery: * No surgery found *      Recommendations:     Discharge therapy intensity: No Therapy Indicated   Discharge Equipment Recommendations: none  Barriers to discharge:  none evident    Assessment:     Richard Carter is a 78 y.o. male with a medical diagnosis of sepsis due to L lower lobe community acquired pna, acute respiratory failure, MRSA, hypotension, mild hypokalemia, hx of CVA with residual L sided deficits. On eval, patient presents with chronic LUE and LLE weakness from CVA.  He presents back to baseline of assist with ADLs.  He requires 1 person assist for stand pivot transfers to w/c at NH with prolonged time for self-propulsion.  Skilled OT services are not warranted at this time as pt back to his functional baseline of assist with ADLs and 1 person assist for transfers.  Please re-consult for any needs.  OT to defer further needs to PT as appropriate.  Plan:     OT to sign off as acute OT services are not warranted at this time.  Please re-consult if situation changes during this hospitalization.    Plan of Care Reviewed with: patient    Subjective     Chief Complaint: "please don't touch my L arm"  Patient/Family Comments/goals: go back to [facility]     Occupational Profile:  Living Environment: lives at [facility]  Previous level of function: 1 person assist stand pivot to w/c, increased time for propulsion of manual w/c  Roles and Routines: father  Equipment Used at Home: hospital bed, wheelchair  Assistance upon Discharge: NH staff    Pain/Comfort:  Pain Rating 1:  (generalized pain complaints with touch, especially L UE)    Patients cultural, spiritual, Church conflicts given the current situation:      Objective:     OT communicated with RN prior to session.      Patient was found HOB elevated with  (IV, vital monitoring) upon OT entry to " room.    General Precautions: Standard, fall  Orthopedic Precautions: N/A  Braces: N/A    Vital Signs: WNL    Bed Mobility:    Patient completed Supine to Sit with moderate assistance  Patient completed Sit to Supine with min assistance    Functional Mobility/Transfers:  Patient completed Sit <> Stand Transfer with minimum assistance  with  no assistive device   Functional Mobility: min A stand pivot to bedside chair with use of gait belt for safety    Activities of Daily Living:  Feeding:  setup    Grooming: setup    Toileting: maximal assistance use of urinal      Functional Cognition:  Intact  Affect: Appropriate to situation        Upper Extremity Function:  Right Upper Extremity:   WFL    Left Upper Extremity:  0/5 with pain LUE  Balance:   Spontaneously returning self to supine, requires RUE support    Therapeutic Positioning  Risk for acquired pressure injuries is significantly increased due to relative decrease in mobility d/t hospitalization  and impaired mobility.    OT interventions performed during the course of today's session in an effort to prevent and/or reduce acquired pressure injuries:   Therapeutic positioning was provided at the conclusion of session to offload all bony prominences for the prevention and/or reduction of pressure injuries    Skin assessment:  known breakdown on coccyx, wc on case      OT recommendations for therapeutic positioning throughout hospitalization:   Follow [facility] Pressure Injury Prevention Protocol  Geomat recommended for sacral protection while [facility]  [employer]- PUP representative notified of bed rec      Patient Education:  Patient provided with verbal education education regarding OT role/goals/POC, post op precautions, fall prevention, safety awareness, Discharge/DME recommendations, and pressure ulcer prevention.  Understanding was verbalized.     Patient left HOB elevated with all lines intact and call button in reach. PUP applied    History:     Past  Medical History:   Diagnosis Date    CVA (cerebral vascular accident)     HTN (hypertension)        No past surgical history on file.    Time Tracking:     OT Date of Treatment:    OT Start Time: 1335  OT Stop Time: 1405  OT Total Time (min): 30 min    Billable Minutes:Evaluation LOW    8/22/2024

## 2024-08-22 NOTE — PT/OT/SLP EVAL
Physical Therapy Evaluation    Patient Name:  Richard Carter   MRN:  [id number]    Recommendations:     Discharge therapy intensity: No Therapy Indicated (back to NH)   Discharge Equipment Recommendations: none   Barriers to discharge: None    Assessment:     Richard Carter is a 78 y.o. male admitted with a medical diagnosis of AMS, severe sepsis, PNA, hypoxia. Hx of CVA and L weakness.  He presents with the following impairments/functional limitations: impaired functional mobility, impaired balance.     At baseline, pt needs one person assistance for stand pivot transfers to and from his w/c at the NH. 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 Pt is hypersensitive to L side. Currently, the pt is near baseline and would benefit from acute PT 3x/week to ensure transfers continue to improve and that he is at baseline when he returns back to NH.     Rehab Prognosis: Good; patient would benefit from acute skilled PT services to address these deficits and reach maximum level of function.    Recent Surgery: * No surgery found *      Plan:     During this hospitalization, patient would benefit from acute PT services 3 x/week to address the identified rehab impairments via gait training, therapeutic activities, therapeutic exercises and progress toward the following goals:    Plan of Care Expires:  09/22/24    Subjective     Chief Complaint: feeling cold  Patient/Family Comments/goals: to go back to NH  Pain/Comfort:  Pain Rating 1:  (sensitive to L UE and LLE)  Pain Addressed 1: Reposition    Patients cultural, spiritual, Druze conflicts given the current situation: no    Living Environment:  From NH  Prior to admission, patients level of function was transfers x1 assist to and from chair and commode. Needs assist for ADL..  Equipment used at home: wheelchair, hospital bed.   Upon discharge, patient will have assistance from NH.    Objective:     Communicated with nurse prior to session.  Patient found  supine with telemetry, pulse ox (continuous), blood pressure cuff, PureWick  upon PT entry to room.    General Precautions: Standard, fall  Orthopedic Precautions:    Braces: N/A  Respiratory Status: Room air  Blood Pressure: 116/74, 96%, 109 HR      Exams:  Cognitive Exam:  Patient is oriented to Person and Place  RLE ROM: WNL  RLE Strength: Deficits: grossly 4/5  LLE ROM: WNL  LLE Strength: Deficits: 2/5 hip flexion, 0/5 ankle DF, 2/5 knee ext  Skin integrity: Wound sacrum per Rn      Functional Mobility:  Bed Mobility:     Supine to Sit: minimum assistance  Sit to Supine: moderate assistance  Transfers:     Bed to Chair: minimum assistance and of 2 persons with  hand-held assist  using  GAIT BELT (pull from behind patient per preference ). Stand pivot towards R side.   Balance: CGA-Min A sitting EOB.       AM-PAC 6 CLICK MOBILITY  Total Score:13       Treatment & Education:    Patient provided with verbal education education regarding PT role/goals/POC.  Understanding was verbalized.     Patient left supine with all lines intact and call button in reach.    GOALS:   Multidisciplinary Problems       Physical Therapy Goals          Problem: Physical Therapy    Goal Priority Disciplines Outcome Goal Variances Interventions   Physical Therapy Goal     PT, PT/OT Progressing     Description: Goals to be met by: 2024     Patient will increase functional independence with mobility by performin. Supine to sit with MInimal Assistance  2. Bed to chair transfer with Contact Guard Assistance using No Assistive Device  3. Sitting at edge of bed x5 minutes with Contact Guard Assistance                         History:     Past Medical History:   Diagnosis Date    CVA (cerebral vascular accident)     HTN (hypertension)        No past surgical history on file.    Time Tracking:     PT Received On: 24  PT Start Time: 1336     PT Stop Time: 1400  PT Total Time (min): 24 min     Billable Minutes: Evaluation  24      08/22/2024

## 2024-08-22 NOTE — PLAN OF CARE
Problem: Adult Inpatient Plan of Care  Goal: Plan of Care Review  Outcome: Progressing  Goal: Patient-Specific Goal (Individualized)  Outcome: Progressing  Goal: Absence of Hospital-Acquired Illness or Injury  Outcome: Progressing  Goal: Optimal Comfort and Wellbeing  Outcome: Progressing  Goal: Readiness for Transition of Care  Outcome: Progressing     Problem: Skin Injury Risk Increased  Goal: Skin Health and Integrity  Outcome: Progressing     Problem: Wound  Goal: Optimal Coping  Outcome: Progressing  Goal: Optimal Functional Ability  Outcome: Progressing  Goal: Absence of Infection Signs and Symptoms  Outcome: Progressing  Goal: Improved Oral Intake  Outcome: Progressing  Goal: Optimal Pain Control and Function  Outcome: Progressing  Goal: Skin Health and Integrity  Outcome: Progressing  Goal: Optimal Wound Healing  Outcome: Progressing

## 2024-08-22 NOTE — PLAN OF CARE
Problem: Physical Therapy  Goal: Physical Therapy Goal  Description: Goals to be met by: 2024     Patient will increase functional independence with mobility by performin. Supine to sit with MInimal Assistance  2. Bed to chair transfer with Contact Guard Assistance using No Assistive Device  3. Sitting at edge of bed x5 minutes with Contact Guard Assistance    Outcome: Progressing

## 2024-08-23 VITALS
HEIGHT: 70 IN | OXYGEN SATURATION: 95 % | WEIGHT: 160.06 LBS | HEART RATE: 104 BPM | SYSTOLIC BLOOD PRESSURE: 103 MMHG | RESPIRATION RATE: 18 BRPM | BODY MASS INDEX: 22.91 KG/M2 | DIASTOLIC BLOOD PRESSURE: 67 MMHG | TEMPERATURE: 98 F

## 2024-08-23 PROBLEM — A41.9 SEPSIS: Status: RESOLVED | Noted: 2024-08-23 | Resolved: 2024-08-23

## 2024-08-23 PROBLEM — A41.9 SEPSIS: Status: ACTIVE | Noted: 2024-08-23

## 2024-08-23 LAB
ALBUMIN SERPL-MCNC: 2.9 G/DL (ref 3.4–4.8)
ALBUMIN/GLOB SERPL: 0.8 RATIO (ref 1.1–2)
ALP SERPL-CCNC: 60 UNIT/L (ref 40–150)
ALT SERPL-CCNC: 13 UNIT/L (ref 0–55)
ANION GAP SERPL CALC-SCNC: 13 MEQ/L
AST SERPL-CCNC: 20 UNIT/L (ref 5–34)
BASOPHILS # BLD AUTO: 0.08 X10(3)/MCL
BASOPHILS NFR BLD AUTO: 1 %
BILIRUB SERPL-MCNC: 0.9 MG/DL
BUN SERPL-MCNC: 7.2 MG/DL (ref 8.4–25.7)
CALCIUM SERPL-MCNC: 8.8 MG/DL (ref 8.8–10)
CHLORIDE SERPL-SCNC: 107 MMOL/L (ref 98–107)
CO2 SERPL-SCNC: 22 MMOL/L (ref 23–31)
CREAT SERPL-MCNC: 0.91 MG/DL (ref 0.73–1.18)
CREAT/UREA NIT SERPL: 8
EOSINOPHIL # BLD AUTO: 0.34 X10(3)/MCL (ref 0–0.9)
EOSINOPHIL NFR BLD AUTO: 4.1 %
ERYTHROCYTE [DISTWIDTH] IN BLOOD BY AUTOMATED COUNT: 13.9 % (ref 11.5–17)
GFR SERPLBLD CREATININE-BSD FMLA CKD-EPI: >60 ML/MIN/1.73/M2
GLOBULIN SER-MCNC: 3.7 GM/DL (ref 2.4–3.5)
GLUCOSE SERPL-MCNC: 108 MG/DL (ref 82–115)
HCT VFR BLD AUTO: 43.2 % (ref 42–52)
HGB BLD-MCNC: 14.5 G/DL (ref 14–18)
IMM GRANULOCYTES # BLD AUTO: 0.07 X10(3)/MCL (ref 0–0.04)
IMM GRANULOCYTES NFR BLD AUTO: 0.8 %
LYMPHOCYTES # BLD AUTO: 2.25 X10(3)/MCL (ref 0.6–4.6)
LYMPHOCYTES NFR BLD AUTO: 27.2 %
MAGNESIUM SERPL-MCNC: 2.4 MG/DL (ref 1.6–2.6)
MCH RBC QN AUTO: 31.5 PG (ref 27–31)
MCHC RBC AUTO-ENTMCNC: 33.6 G/DL (ref 33–36)
MCV RBC AUTO: 93.9 FL (ref 80–94)
MONOCYTES # BLD AUTO: 0.78 X10(3)/MCL (ref 0.1–1.3)
MONOCYTES NFR BLD AUTO: 9.4 %
NEUTROPHILS # BLD AUTO: 4.76 X10(3)/MCL (ref 2.1–9.2)
NEUTROPHILS NFR BLD AUTO: 57.5 %
NRBC BLD AUTO-RTO: 0 %
PLATELET # BLD AUTO: 201 X10(3)/MCL (ref 130–400)
PMV BLD AUTO: 10.9 FL (ref 7.4–10.4)
POTASSIUM SERPL-SCNC: 3.2 MMOL/L (ref 3.5–5.1)
PROT SERPL-MCNC: 6.6 GM/DL (ref 5.8–7.6)
RBC # BLD AUTO: 4.6 X10(6)/MCL (ref 4.7–6.1)
SODIUM SERPL-SCNC: 142 MMOL/L (ref 136–145)
WBC # BLD AUTO: 8.28 X10(3)/MCL (ref 4.5–11.5)

## 2024-08-23 PROCEDURE — 25000003 PHARM REV CODE 250

## 2024-08-23 PROCEDURE — 99900035 HC TECH TIME PER 15 MIN (STAT)

## 2024-08-23 PROCEDURE — 27000221 HC OXYGEN, UP TO 24 HOURS

## 2024-08-23 PROCEDURE — 83735 ASSAY OF MAGNESIUM: CPT | Performed by: INTERNAL MEDICINE

## 2024-08-23 PROCEDURE — 25000003 PHARM REV CODE 250: Performed by: INTERNAL MEDICINE

## 2024-08-23 PROCEDURE — 94760 N-INVAS EAR/PLS OXIMETRY 1: CPT

## 2024-08-23 PROCEDURE — 80053 COMPREHEN METABOLIC PANEL: CPT | Performed by: INTERNAL MEDICINE

## 2024-08-23 PROCEDURE — 63600175 PHARM REV CODE 636 W HCPCS: Performed by: INTERNAL MEDICINE

## 2024-08-23 PROCEDURE — 25000003 PHARM REV CODE 250: Performed by: STUDENT IN AN ORGANIZED HEALTH CARE EDUCATION/TRAINING PROGRAM

## 2024-08-23 PROCEDURE — 85025 COMPLETE CBC W/AUTO DIFF WBC: CPT | Performed by: INTERNAL MEDICINE

## 2024-08-23 PROCEDURE — 36415 COLL VENOUS BLD VENIPUNCTURE: CPT | Performed by: INTERNAL MEDICINE

## 2024-08-23 RX ORDER — DOXYCYCLINE 100 MG/1
100 CAPSULE ORAL EVERY 12 HOURS
Qty: 14 CAPSULE | Refills: 0 | Status: SHIPPED | OUTPATIENT
Start: 2024-08-23 | End: 2024-08-30

## 2024-08-23 RX ORDER — POTASSIUM CHLORIDE 20 MEQ/1
20 TABLET, EXTENDED RELEASE ORAL ONCE
Status: COMPLETED | OUTPATIENT
Start: 2024-08-23 | End: 2024-08-23

## 2024-08-23 RX ADMIN — GABAPENTIN 100 MG: 100 CAPSULE ORAL at 02:08

## 2024-08-23 RX ADMIN — FAMOTIDINE 20 MG: 20 TABLET, FILM COATED ORAL at 08:08

## 2024-08-23 RX ADMIN — POTASSIUM CHLORIDE 20 MEQ: 1500 TABLET, EXTENDED RELEASE ORAL at 08:08

## 2024-08-23 RX ADMIN — Medication: at 08:08

## 2024-08-23 RX ADMIN — ATORVASTATIN CALCIUM 40 MG: 40 TABLET, FILM COATED ORAL at 08:08

## 2024-08-23 RX ADMIN — CLOPIDOGREL BISULFATE 75 MG: 75 TABLET ORAL at 08:08

## 2024-08-23 RX ADMIN — MUPIROCIN: 20 OINTMENT TOPICAL at 08:08

## 2024-08-23 RX ADMIN — GABAPENTIN 100 MG: 100 CAPSULE ORAL at 08:08

## 2024-08-23 RX ADMIN — ACETAMINOPHEN 650 MG: 325 TABLET, FILM COATED ORAL at 02:08

## 2024-08-23 RX ADMIN — TAMSULOSIN HYDROCHLORIDE 0.4 MG: 0.4 CAPSULE ORAL at 08:08

## 2024-08-23 RX ADMIN — PIPERACILLIN SODIUM AND TAZOBACTAM SODIUM 4.5 G: 4; .5 INJECTION, POWDER, LYOPHILIZED, FOR SOLUTION INTRAVENOUS at 08:08

## 2024-08-23 RX ADMIN — ACETAMINOPHEN 650 MG: 325 TABLET, FILM COATED ORAL at 08:08

## 2024-08-23 NOTE — DISCHARGE SUMMARY
[facility] - [facility] Tele[facility] Hospital Medicine  Discharge Summary      Patient Name: Richard Carter  MRN: [id number]  [facility]anner: [id number]  Patient Class: IP- Inpatient  Admission Date: 8/20/2024  Hospital Length of Stay: 3 days  Discharge Date and Time:  08/23/2024 11:20 AM  Attending Physician: Sarah Peña DO   Discharging Provider: Yong Alex MD  Primary Care Provider: Babs, Primary Doctor    Primary Care Team: Networked reference to record PCT   Richard Carter is a 78 y.o. male with a PMHx of essential hypertension, hyperlipidemia, CVA with residual left-sided deficits, GERD, CAD, depression, insomnia, BPH and osteoarthritis who presented to [facility] via EMS from [facility] on 8/20/2024 with c/o altered mental status a fever x3 days.  Also endorsed a cough and shortness of breath.  EMS reported fever of 101.5 of the nursing home and hypoxia of 86% on room air upon their arrival.  He required supplemental oxygen via nasal cannula at 4 L.  Initial labs notable for hemoglobin 13.9, hematocrit 41.1, chloride 112, CO2 19, BUN 32.2, creatinine 1.71, glucose 130, calcium 8.7, albumin 3, lactic acidosis.  MRSA PCR detected.  Influenza and COVID-19 negative.  Urinalysis with 1+ protein, 1+ ketones, 2+blood, 11-20 hyaline casts.  Blood cultures X2 and urine culture pending.  CXR demonstrated confluent opacities in the left lower lobe infiltrate can not be excluded.  He became hypotensive in the ED and despite IV fluid resuscitation requiring vasopressors.  He was admitted to ICU.  He was also started on broad-spectrum IV antibiotics with vancomycin and Zosyn.  He has been weaned off of Levophed. Mentation has improved. He was cleared for downgrade to the floor on 08/21/2024.  Hospital medicine consulted for assumption of care and further medical management.  At baseline he is independent with ADLs and IADLs.  Empiric antibiotics vancomycin and Zosyn were continued.  MRSA PCR was  "negative.  Hematocrit clinical recovery and was weaned off to room air and tolerated well with therapy.  He will be discharged back to his facility to complete empiric doxycycline for 1 more week.  Blood cultures remain negative.  All medications were reconciled, necessary prescriptions were provided.      Sepsis secondary to left lower lobe community-acquired pneumonia-POA   Acute hypoxemic respiratory failure due to above-improving   MRSA PCR positive   Borderline hypotension-resolving   Mild hypokalemia         HX:  essential hypertension, hyperlipidemia, CVA with residual left-sided deficits, GERD, CAD, depression, insomnia, BPH and osteoarthritis              Goals of Care Treatment Preferences:  Code Status: Full Code    Vitals:    08/23/24 1114   BP: 103/67   Pulse: 106   Resp:    Temp: 98.2 °F (36.8 °C)       General: Comfortable, not in distress  Respiratory: Clear to auscultation bilaterally, nonlabored breathing  Cardiovascular: RRR, S1, S2  Abdominal: Soft, nontender, nondistended  Neurological: AOx4, no focal deficits  Psychiatric: Cooperative         Consults:   Consults (From admission, onward)          Status Ordering Provider     Inpatient consult to Registered Dietitian/Nutritionist  Once        Provider:  (Not yet assigned)    Completed BUX, BEE     Pharmacy to dose Vancomycin consult  Once        Provider:  (Not yet assigned)   Placed in "And" Linked Group    Acknowledged CORINNE YU, AISSATOU AMIN            No new Assessment & Plan notes have been filed under this hospital service since the last note was generated.  Service: Hospital Medicine    Final Active Diagnoses:      Problems Resolved During this Admission:    Diagnosis Date Noted Date Resolved POA    PRINCIPAL PROBLEM:  Sepsis [A41.9] 08/23/2024 08/23/2024 Yes       Discharged Condition: good    Disposition:     Follow Up:   Follow-up Information       No, Primary Doctor Follow up in 1 week(s).                           Patient " Instructions:   No discharge procedures on file.    Significant Diagnostic Studies: Labs: CMP   Recent Labs   Lab 08/22/24  0352 08/23/24  0635    142   K 3.4* 3.2*   * 107   CO2 20* 22*   BUN 8.8 7.2*   CREATININE 0.71* 0.91   CALCIUM 8.6* 8.8   ALBUMIN 2.9* 2.9*   BILITOT 1.0 0.9   ALKPHOS 58 60   AST 23 20   ALT 13 13       Pending Diagnostic Studies:       None           Medications:  Reconciled Home Medications:      Medication List        START taking these medications      doxycycline 100 MG Cap  Commonly known as: VIBRAMYCIN  Take 1 capsule (100 mg total) by mouth every 12 (twelve) hours. for 7 days            CONTINUE taking these medications      acetaminophen 650 MG Tbsr  Commonly known as: TYLENOL  Take 650 mg by mouth every 8 (eight) hours.     atorvastatin 40 MG tablet  Commonly known as: LIPITOR  Take 40 mg by mouth once daily.     clopidogreL 75 mg tablet  Commonly known as: PLAVIX  Take 75 mg by mouth once daily.     FLOMAX 0.4 mg Cap  Generic drug: tamsulosin  Take 0.4 mg by mouth once daily.     gabapentin 100 MG capsule  Commonly known as: NEURONTIN  Take 100 mg by mouth 3 (three) times daily.     magnesium glycinate 100 mg magnesium Cap  Take 100 mg by mouth once. Give 2 capsules one time a day r/t insomnia     meloxicam 7.5 MG tablet  Commonly known as: MOBIC  Take 7.5 mg by mouth once daily.     metoprolol succinate 25 MG 24 hr tablet  Commonly known as: TOPROL-XL  Take 25 mg by mouth once daily.     mirtazapine 30 MG tablet  Commonly known as: REMERON  Take 30 mg by mouth every evening.     QUEtiapine 200 MG Tb24  Commonly known as: SEROQUEL XR  Take 150 mg by mouth once daily.     traMADoL 50 mg tablet  Commonly known as: ULTRAM  Take 50 mg by mouth every 12 (twelve) hours.              Indwelling Lines/Drains at time of discharge:   Lines/Drains/Airways       None                   Time spent on the discharge of patient: 35 minutes         Yong Alex MD  Department of  [facility]  [facility] - [address] [place] [address]

## 2024-08-23 NOTE — PLAN OF CARE
08/23/24 1354   Final Note   Assessment Type Final Discharge Note   Anticipated Discharge Disposition [facility]   Post-Acute Status   Discharge Delays None known at this time     Pt will return to [facility] care. Facility will provide transportation

## 2024-08-23 NOTE — NURSING
Tried to call patients son to alert him of transfer to [phone] but son Colin did not answer. Will try again after transfer is complete .

## 2024-08-23 NOTE — PROGRESS NOTES
Report given to nurse Ko at [facility]. D/C instructions and AVS provided to and reviewed with patient. Telemetry and IV removed; no bleeding or S&S of infection present. Vital signs obtained and stable. D/C floor via W/C with NH transporter via transport van in stable condition.

## 2024-08-23 NOTE — NURSING
Nurses Note -- 4 Eyes      8/23/2024   4:13 AM      Skin assessed during: Transfer      [] No Altered Skin Integrity Present    [x]Prevention Measures Documented      [x] Yes- Altered Skin Integrity Present or Discovered   [] LDA Added if Not in Epic (Describe Wound)   [] New Altered Skin Integrity was Present on Admit and Documented in LDA   [] Wound Image Taken    Wound Care Consulted? Yes    Attending Nurse:  Madiha Norton RN/Staff Member:  Gloria Ashley

## 2024-08-23 NOTE — PROGRESS NOTES
Pharmacokinetic Assessment Follow Up: IV Vancomycin    Vancomycin serum concentration assessment(s):    The trough level was drawn correctly and can be used to guide therapy at this time. The measurement is within the desired definitive target range of 15 to 20 mcg/mL.      Vancomycin Regimen Plan:    Continue 750 mg q12h.  Check trough on 8/24/24  MRSA PCR positive    Drug levels (last 3 results):  Recent Labs   Lab Result Units 08/21/24  0420 08/22/24  0858 08/22/24 2027   Vancomycin Random ug/ml 11.9*  --   --    Vancomycin Trough ug/ml  --  25.1* 17.2        Patient brief summary:  Richard Carter is a 78 y.o. male initiated on antimicrobial therapy with IV Vancomycin for treatment of sepsis    Actual Body Weight:   72.6 kg    Renal Function:   Estimated Creatinine Clearance: 90.9 mL/min (A) (based on SCr of 0.71 mg/dL (L)).,     Dialysis Method (if applicable):  N/A    CBC (last 72 hours):  Recent Labs   Lab Result Units 08/20/24  0819 08/21/24 0420 08/22/24  0352   WBC x10(3)/mcL 9.07 7.10 8.70   Hgb g/dL 13.9* 11.7* 13.9*   Hct % 41.1* 34.4* 41.1*   Platelet x10(3)/mcL 172 178 212   Mono % % 6.6 8.5 8.5   Eos % % 0.1 2.1 3.1   Basophil % % 0.4 0.7 0.9       Metabolic Panel (last 72 hours):  Recent Labs   Lab Result Units 08/20/24  0827 08/20/24  0839 08/21/24  0420 08/22/24  0352   Sodium mmol/L  --  142 139 142   Potassium mmol/L  --  3.9 3.4* 3.4*   Chloride mmol/L  --  112* 110* 108*   CO2 mmol/L  --  19* 20* 20*   Glucose mg/dL  --  130* 88 85   Glucose, UA  Normal  --   --   --    Blood Urea Nitrogen mg/dL  --  32.2* 22.7 8.8   Creatinine mg/dL  --  1.71* 0.89 0.71*   Albumin g/dL  --  3.0* 2.5* 2.9*   Bilirubin Total mg/dL  --  0.7 1.0 1.0   ALP unit/L  --  57 50 58   AST unit/L  --  26 23 23   ALT unit/L  --  12 11 13   Magnesium Level mg/dL  --   --  2.10  --    Phosphorus Level mg/dL  --   --  2.4  --        Vancomycin Administrations:  vancomycin given in the last 96 hours                      vancomycin 750 mg in dextrose 5 % 250 mL IVPB (ready to mix) (mg) 750 mg New Bag 08/22/24 2030     750 mg New Bag  0846     750 mg New Bag 08/21/24 2016     750 mg New Bag  0856    vancomycin 1.75 g in 5 % dextrose 500 mL IVPB (mg) 1,750 mg New Bag 08/20/24 0901                    Microbiologic Results:  Microbiology Results (last 7 days)       Procedure Component Value Units Date/Time    Blood Culture [[id number]]  (Normal) Collected: 08/20/24 0839    Order Status: Completed Specimen: Blood Updated: 08/22/24 0901     Blood Culture No Growth At 48 Hours    Blood Culture [[id number]]  (Normal) Collected: 08/20/24 0840    Order Status: Completed Specimen: Blood Updated: 08/22/24 0901     Blood Culture No Growth At 48 Hours    Urine Culture High Risk [[id number]] Collected: 08/20/24 2114    Order Status: Completed Specimen: Urine, Unspecified Updated: 08/22/24 0857     Urine Culture No Growth    Respiratory Culture [[id number]]     Order Status: Sent Specimen: Sputum

## 2024-08-25 LAB
BACTERIA BLD CULT: NORMAL
BACTERIA BLD CULT: NORMAL

## 2024-10-16 ENCOUNTER — LAB REQUISITION (OUTPATIENT)
Dept: LAB | Facility: HOSPITAL | Age: 79
End: 2024-10-16
Payer: MEDICARE

## 2024-10-16 DIAGNOSIS — N40.0 BENIGN PROSTATIC HYPERPLASIA WITHOUT LOWER URINARY TRACT SYMPTOMS: ICD-10-CM

## 2024-10-16 LAB — PSA SERPL-MCNC: 0.81 NG/ML

## 2024-10-16 PROCEDURE — 84153 ASSAY OF PSA TOTAL: CPT | Performed by: INTERNAL MEDICINE

## 2024-12-30 ENCOUNTER — LAB REQUISITION (OUTPATIENT)
Dept: LAB | Facility: HOSPITAL | Age: 79
End: 2024-12-30
Payer: MEDICARE

## 2024-12-30 DIAGNOSIS — J18.9 PNEUMONIA, UNSPECIFIED ORGANISM: ICD-10-CM

## 2024-12-30 LAB
ALBUMIN SERPL-MCNC: 2 G/DL (ref 3.4–4.8)
ALBUMIN/GLOB SERPL: 0.4 RATIO (ref 1.1–2)
ALP SERPL-CCNC: 108 UNIT/L (ref 40–150)
ALT SERPL-CCNC: 12 UNIT/L (ref 0–55)
ANION GAP SERPL CALC-SCNC: 17 MEQ/L
AST SERPL-CCNC: 31 UNIT/L (ref 5–34)
BASOPHILS # BLD AUTO: 0.04 X10(3)/MCL
BASOPHILS NFR BLD AUTO: 0.4 %
BILIRUB SERPL-MCNC: 0.5 MG/DL
BUN SERPL-MCNC: 14.8 MG/DL (ref 8.4–25.7)
CALCIUM SERPL-MCNC: 8.6 MG/DL (ref 8.8–10)
CHLORIDE SERPL-SCNC: 101 MMOL/L (ref 98–107)
CO2 SERPL-SCNC: 23 MMOL/L (ref 23–31)
CREAT SERPL-MCNC: 0.82 MG/DL (ref 0.72–1.25)
CREAT/UREA NIT SERPL: 18
EOSINOPHIL # BLD AUTO: 0.03 X10(3)/MCL (ref 0–0.9)
EOSINOPHIL NFR BLD AUTO: 0.3 %
ERYTHROCYTE [DISTWIDTH] IN BLOOD BY AUTOMATED COUNT: 15 % (ref 11.5–17)
GFR SERPLBLD CREATININE-BSD FMLA CKD-EPI: >60 ML/MIN/1.73/M2
GLOBULIN SER-MCNC: 5.5 GM/DL (ref 2.4–3.5)
GLUCOSE SERPL-MCNC: 79 MG/DL (ref 82–115)
HCT VFR BLD AUTO: 44.9 % (ref 42–52)
HGB BLD-MCNC: 14.5 G/DL (ref 14–18)
IMM GRANULOCYTES # BLD AUTO: 0.06 X10(3)/MCL (ref 0–0.04)
IMM GRANULOCYTES NFR BLD AUTO: 0.7 %
LYMPHOCYTES # BLD AUTO: 1.84 X10(3)/MCL (ref 0.6–4.6)
LYMPHOCYTES NFR BLD AUTO: 20 %
MCH RBC QN AUTO: 31.3 PG (ref 27–31)
MCHC RBC AUTO-ENTMCNC: 32.3 G/DL (ref 33–36)
MCV RBC AUTO: 96.8 FL (ref 80–94)
MONOCYTES # BLD AUTO: 0.5 X10(3)/MCL (ref 0.1–1.3)
MONOCYTES NFR BLD AUTO: 5.4 %
NEUTROPHILS # BLD AUTO: 6.75 X10(3)/MCL (ref 2.1–9.2)
NEUTROPHILS NFR BLD AUTO: 73.2 %
NRBC BLD AUTO-RTO: 0 %
PLATELET # BLD AUTO: 284 X10(3)/MCL (ref 130–400)
PMV BLD AUTO: 10.4 FL (ref 7.4–10.4)
POTASSIUM SERPL-SCNC: 3.5 MMOL/L (ref 3.5–5.1)
PROT SERPL-MCNC: 7.5 GM/DL (ref 5.8–7.6)
RBC # BLD AUTO: 4.64 X10(6)/MCL (ref 4.7–6.1)
SODIUM SERPL-SCNC: 141 MMOL/L (ref 136–145)
WBC # BLD AUTO: 9.22 X10(3)/MCL (ref 4.5–11.5)

## 2024-12-30 PROCEDURE — 80053 COMPREHEN METABOLIC PANEL: CPT | Performed by: INTERNAL MEDICINE

## 2024-12-30 PROCEDURE — 85025 COMPLETE CBC W/AUTO DIFF WBC: CPT | Performed by: INTERNAL MEDICINE
